# Patient Record
Sex: FEMALE | Race: WHITE | NOT HISPANIC OR LATINO | Employment: OTHER | URBAN - METROPOLITAN AREA
[De-identification: names, ages, dates, MRNs, and addresses within clinical notes are randomized per-mention and may not be internally consistent; named-entity substitution may affect disease eponyms.]

---

## 2017-01-06 RX ORDER — PRAMIPEXOLE DIHYDROCHLORIDE 1 MG/1
1 TABLET ORAL NIGHTLY
Qty: 30 TABLET | Refills: 3 | Status: SHIPPED | OUTPATIENT
Start: 2017-01-06

## 2017-01-06 RX ORDER — TRAZODONE HYDROCHLORIDE 50 MG/1
50 TABLET ORAL NIGHTLY
Qty: 30 TABLET | Refills: 3 | Status: SHIPPED | OUTPATIENT
Start: 2017-01-06

## 2017-01-23 ENCOUNTER — OFFICE VISIT (OUTPATIENT)
Dept: PODIATRY | Facility: CLINIC | Age: 62
End: 2017-01-23
Payer: COMMERCIAL

## 2017-01-23 ENCOUNTER — HOSPITAL ENCOUNTER (OUTPATIENT)
Dept: RADIOLOGY | Facility: CLINIC | Age: 62
Discharge: HOME | End: 2017-01-23
Attending: PODIATRIST
Payer: COMMERCIAL

## 2017-01-23 ENCOUNTER — TELEPHONE (OUTPATIENT)
Dept: FAMILY MEDICINE | Facility: CLINIC | Age: 62
End: 2017-01-23

## 2017-01-23 VITALS — BODY MASS INDEX: 30.09 KG/M2 | HEIGHT: 60 IN | WEIGHT: 153.25 LBS

## 2017-01-23 DIAGNOSIS — S90.32XA CONTUSION OF FOOT, LEFT: ICD-10-CM

## 2017-01-23 DIAGNOSIS — S92.505A CLOSED NONDISPLACED FRACTURE OF PHALANX OF LESSER TOE OF LEFT FOOT, UNSPECIFIED PHALANX, INITIAL ENCOUNTER: ICD-10-CM

## 2017-01-23 DIAGNOSIS — S92.505A CLOSED NONDISPLACED FRACTURE OF PHALANX OF LESSER TOE OF LEFT FOOT, UNSPECIFIED PHALANX, INITIAL ENCOUNTER: Primary | ICD-10-CM

## 2017-01-23 PROCEDURE — 99213 OFFICE O/P EST LOW 20 MIN: CPT | Mod: 25,S$GLB,, | Performed by: PODIATRIST

## 2017-01-23 PROCEDURE — 64455 NJX AA&/STRD PLTR COM DG NRV: CPT | Mod: RT,S$GLB,, | Performed by: PODIATRIST

## 2017-01-23 PROCEDURE — 73630 X-RAY EXAM OF FOOT: CPT | Mod: 50,TC,PO

## 2017-01-23 PROCEDURE — 73630 X-RAY EXAM OF FOOT: CPT | Mod: 26,50,S$GLB, | Performed by: RADIOLOGY

## 2017-01-23 PROCEDURE — 99999 PR PBB SHADOW E&M-EST. PATIENT-LVL III: CPT | Mod: PBBFAC,,, | Performed by: PODIATRIST

## 2017-01-23 PROCEDURE — 29550 STRAPPING OF TOES: CPT | Mod: 59,LT,S$GLB, | Performed by: PODIATRIST

## 2017-01-23 RX ORDER — DICLOFENAC SODIUM 10 MG/G
2 GEL TOPICAL 4 TIMES DAILY
Qty: 100 G | Refills: 2 | Status: SHIPPED | OUTPATIENT
Start: 2017-01-23

## 2017-01-23 RX ORDER — DEXAMETHASONE SODIUM PHOSPHATE 4 MG/ML
4 INJECTION, SOLUTION INTRA-ARTICULAR; INTRALESIONAL; INTRAMUSCULAR; INTRAVENOUS; SOFT TISSUE
Status: COMPLETED | OUTPATIENT
Start: 2017-01-23 | End: 2017-01-23

## 2017-01-23 RX ORDER — TRIAMCINOLONE ACETONIDE 40 MG/ML
40 INJECTION, SUSPENSION INTRA-ARTICULAR; INTRAMUSCULAR
Status: COMPLETED | OUTPATIENT
Start: 2017-01-23 | End: 2017-01-23

## 2017-01-23 RX ADMIN — TRIAMCINOLONE ACETONIDE 40 MG: 40 INJECTION, SUSPENSION INTRA-ARTICULAR; INTRAMUSCULAR at 07:01

## 2017-01-23 RX ADMIN — DEXAMETHASONE SODIUM PHOSPHATE 4 MG: 4 INJECTION, SOLUTION INTRA-ARTICULAR; INTRALESIONAL; INTRAMUSCULAR; INTRAVENOUS; SOFT TISSUE at 07:01

## 2017-01-23 NOTE — PROGRESS NOTES
Reviewed resident note, exam and procedures were performed under my direct supervision.  Agree with note and care.  Discrepancies discussed.    Left 5th toe fx, right stump neuroma.    Counseled the patient on the potential complications of local injection of corticosteroid including, but not limited to:  Discoloration of skin, erosion of soft tissue, and increased likelihood of rupture of a soft tissue structure (ie. Plantar fascia, muscle, tendon, ligament, or capsule in the area of injection).  Patient indicates understanding of my explanation, that any questions have been answered to patient satisfaction, and patient gives verbal consent for injection of affected area.    After sterile skin prep, steroid injection was performed with patient verbal consent and timeout procedure, at right 3rd intermetatarsal plantar nerve using  20 mg of Kenalog, 4mg dexamethazone sodium phosphate, and 1mL 2% Lidocaine plain. This was well tolerated.    Patient will stretch the tendo achilles complex three times daily as demonstrated in the office.  Literature was dispensed illustrating proper stretching technique.    I applied a plantar rest strapping to the patient's foot to offload symptomatic area, support the arch, and relieve pain.    Timothy tape/compression strapping left 5th toe - minimize motion, swelling, pain - repeat daily as symptoms dictate - ambulate in sx shoe to tolerance - wean to shoes as symptoms allow.    X-rays both feet.  voltaren gel prn.    Discussed conservative treatment with shoes of adequate dimensions, material, and style to alleviate symptoms and delay or prevent surgical intervention.     Return to clinic one month, sooner prn.

## 2017-01-23 NOTE — MR AVS SNAPSHOT
[facility]  [address] [place] [address]  Phone: [phone]                  Sierra Cochran   2017 7:00 AM   Office Visit    Description:  Female : 1955   Provider:  Kelton Bermudez DPM   Department:  [facility]           Reason for Visit     Foot Injury           Diagnoses this Visit        Comments    Closed nondisplaced fracture of phalanx of lesser toe of left foot, unspecified phalanx, initial encounter    -  Primary            To Do List           Future Appointments        Provider Department Dept Phone    2017 7:45 AM [facility] XR1 [facility] [phone]    2017 8:00 AM [facility] DEXA 1 [facility] [phone]    2017 7:00 AM Kelton Bermudez DPM [facility] [phone]    3/3/2017 8:00 AM Sigifredo Blakely PA-C [facility] [phone]      Goals (5 Years of Data)     None      Follow-Up and Disposition     Return in about 1 month (around 2017).       These Medications        Disp Refills Start End    diclofenac sodium 1 % Gel 100 g 2 2017     Apply 2 g topically 4 (four) times daily. - Topical    Pharmacy: [facility] [address] [place] [address] Ph #: [phone]         [facility]     [facility] -  Assistance  Registered nurses in the [facility] provide clinical advisement, health education, appointment booking, and other advisory services.  Call for this free service at [phone].             Medications           Message regarding Medications     Verify the changes and/or additions to your medication regime listed below are the same as discussed with your clinician today.  If any of these changes or additions are incorrect, please notify your healthcare provider.        START taking these NEW medications        Refills    diclofenac sodium 1 % Gel 2    Sig: Apply 2 g topically 4 (four) times daily.    Class: Normal     Route: Topical      These medications were administered today        Dose Freq    dexamethasone injection 4 mg 4 mg Clinic/HOD 1 time    Sig: Inject 1 mL (4 mg total) into the vein one time.    Class: Normal    Route: Intravenous    triamcinolone acetonide injection 40 mg 40 mg Clinic/HOD 1 time    Sig: Inject 1 mL (40 mg total) into the muscle one time.    Class: Normal    Route: Intramuscular           Verify that the below list of medications is an accurate representation of the medications you are currently taking.  If none reported, the list may be blank. If incorrect, please contact your healthcare provider. Carry this list with you in case of emergency.           Current Medications     clobetasol (TEMOVATE) 0.05 % cream Apply topically 2 (two) times daily.    cyclobenzaprine (FLEXERIL) 10 MG tablet Take 10 mg by mouth every evening.     diclofenac sodium 1 % Gel Apply 2 g topically 4 (four) times daily.    diphenhydrAMINE (BENADRYL ALLERGY) 25 mg tablet Take 25 mg by mouth nightly as needed for Insomnia.    docusate sodium (COLACE) 50 MG capsule Take 100 mg by mouth 2 (two) times daily.    estradiol (ESTRACE) 1 MG tablet Take 1 tablet (1 mg total) by mouth once daily.    gabapentin (NEURONTIN) 100 MG capsule 1 pill am, 1 pill noon    gabapentin (NEURONTIN) 300 MG capsule Take 1 capsule (300 mg total) by mouth every evening.    omeprazole (PRILOSEC) 40 MG capsule Take 40 mg by mouth 2 (two) times daily before meals.    oxycodone-acetaminophen (PERCOCET)  mg per tablet Take 1 tablet by mouth every 4 (four) hours as needed for Pain.    pramipexole (MIRAPEX) 1 MG tablet Take 1 tablet (1 mg total) by mouth every evening.    rizatriptan (MAXALT) 10 MG tablet Take 1 tablet (10 mg total) by mouth 3 (three) times daily as needed.    tramadol (ULTRAM) 50 mg tablet Take 1 tablet (50 mg total) by mouth every 6 (six) hours as needed for Pain.    trazodone (DESYREL) 50 MG tablet Take 1 tablet (50 mg total) by mouth  every evening.    zolpidem (AMBIEN) 10 mg Tab TAKE ONE TABLET BY MOUTH AT BEDTIME AS NEEDED           Clinical Reference Information           Vital Signs - Last Recorded  Most recent update: 1/23/2017  6:58 AM by Trice Cerrato LPN    Ht Wt BMI          5' (1.524 m) 69.5 kg (153 lb 3.5 oz) 29.92 kg/m2        Allergies as of 1/23/2017     Tizanidine    Augmentin [Amoxicillin-pot Clavulanate]    Codeine    Demerol [Meperidine]      Immunizations Administered on Date of Encounter - 1/23/2017     None      Orders Placed During Today's Visit     Future Labs/Procedures Expected by Expires    X-Ray Foot Complete Bilateral  1/23/2017 1/24/2018      Administrations This Visit     dexamethasone injection 4 mg     Admin Date Action Dose Route Administered By             01/23/2017 Given 4 mg Intravenous Kelton Bermudez DPM                    triamcinolone acetonide injection 40 mg     Admin Date Action Dose Route Administered By             01/23/2017 Given 40 mg Intramuscular Kelton Bermudez DPM

## 2017-01-23 NOTE — TELEPHONE ENCOUNTER
----- Message from Mojgan Lozano sent at 1/23/2017  8:02 AM CST -----  Contact: self [phone]  Pt was here in the clinic getting a chest xray this morning and wanted to see about getting an order put in for a tb test/ please call thanks

## 2017-01-23 NOTE — PROGRESS NOTES
Subjective:      Patient ID: Sierra Cochran is a 61 y.o. female.    Chief Complaint: Foot Injury (broken left 5th toe)    Sierra is a 61 y.o. female who presents to the podiatry clinic  with complaint of  left foot fifth toe after stubbing the toe on a door about a week ago.  Current symptoms include: ability to bear weight, but with some pain, worsening symptoms after a period of activity and pain with pressure to the left fifth toe.  Symptoms have progressed to a point and plateaued. Patient has had prior foot problems with a neuroma to the right foot removed several years ago. Evaluation to date: plain films: At another facility, patient says she was told the toe was broken. Treatment to date: none. Patients rates pain 5/10 on pain scale.    Cc#2: Right foot she had neurectomies of interspaces 2-3 a couple years ago and recently has been having some pain, only with weight bearing. She describes it like she is walking on a balled up sock in the area. She has not done anything to treat this yet. The symptoms have progressed to a point but do not seem to be getting worse right now.    Review of Systems   Constitution: Negative for chills, fever, weakness and malaise/fatigue.   Cardiovascular: Negative for claudication and leg swelling.   Respiratory: Negative for shortness of breath.    Skin: Negative for dry skin, itching, nail changes and rash.   Musculoskeletal: Negative for joint pain, muscle cramps, muscle weakness and myalgias.   Gastrointestinal: Negative for nausea and vomiting.   Neurological: Positive for numbness (right 3-4 toes at times). Negative for focal weakness, loss of balance and paresthesias.           Objective:      Physical Exam   Constitutional: She is oriented to person, place, and time. She appears well-developed and well-nourished. No distress.   Cardiovascular:   Pulses:       Dorsalis pedis pulses are 2+ on the right side, and 2+ on the left side.        Posterior tibial pulses are 2+ on  the right side, and 2+ on the left side.   < 3 sec capillary refill time to toes 1-5 bilateral. Toes and feet are warm to touch proximally with normal distal cooling b/l. There is some hair growth on the feet and toes b/l. There is no edema b/l. No spider veins or varicosities present b/l.      Musculoskeletal:   Left fifth toe slight bruising/erythema with pain to palpation.    Equinus noted b/l ankles with < 10 deg DF noted. MMT 5/5 in DF/PF/Inv/Ev resistance with no reproduction of pain in any direction. Passive range of motion of ankle and pedal joints is painless b/l.     Neurological: She is alert and oriented to person, place, and time. She has normal strength. She displays no atrophy and no tremor. No sensory deficit. She exhibits normal muscle tone.   Reflex Scores:       Achilles reflexes are 2+ on the right side and 2+ on the left side.  Negative tinel sign bilateral. Pain with palpation within the third interspace where a previous neuroma was removed.   Skin: Skin is warm, dry and intact. Bruising (left fifth toe) noted. No abrasion, no burn, no ecchymosis, no laceration, no lesion, no petechiae and no rash noted. She is not diaphoretic. There is erythema. No cyanosis. No pallor. Nails show no clubbing.   Skin temperature, texture and turgor within normal limits.   Psychiatric: She has a normal mood and affect. Her behavior is normal.             Assessment:       Encounter Diagnosis   Name Primary?    Closed nondisplaced fracture of phalanx of lesser toe of left foot, unspecified phalanx, initial encounter Yes         Plan:       Sierra was seen today for foot injury.    Diagnoses and all orders for this visit:    Closed nondisplaced fracture of phalanx of lesser toe of left foot, unspecified phalanx, initial encounter  -     X-Ray Foot Complete Bilateral; Future    Other orders  -     diclofenac sodium 1 % Gel; Apply 2 g topically 4 (four) times daily.  -     dexamethasone injection 4 mg; Inject 1 mL  (4 mg total) into the vein one time.  -     triamcinolone acetonide injection 40 mg; Inject 1 mL (40 mg total) into the muscle one time.      I counseled the patient on her conditions, their implications and medical management.    After sterilizing the area with an alcohol prep, the affected area right third interspace was injected with a  solution containing 1 cc of 2% Lidocaine  plain and 1 cc of dexamethasone, 1 cc kenalog.  The patient tolerated the injection well and reported comfort to the area     Left fifth toe was sultana splinted to the fourth toe, x-rays to be taken to confirm fracture    I applied a plantar rest strapping to the patient's right foot to offload symptomatic area, support the arch, and relieve pain.    Return in 2 weeks for follow up    Avinash Garg DPM PGY-3  Pager [phone]

## 2017-02-08 RX ORDER — ZOLPIDEM TARTRATE 10 MG/1
TABLET ORAL
Qty: 30 TABLET | Refills: 0 | Status: SHIPPED | OUTPATIENT
Start: 2017-02-08

## 2017-02-20 ENCOUNTER — OFFICE VISIT (OUTPATIENT)
Dept: RHEUMATOLOGY | Facility: CLINIC | Age: 62
End: 2017-02-20
Payer: COMMERCIAL

## 2017-02-20 VITALS
SYSTOLIC BLOOD PRESSURE: 113 MMHG | BODY MASS INDEX: 29.82 KG/M2 | HEART RATE: 94 BPM | WEIGHT: 151.88 LBS | DIASTOLIC BLOOD PRESSURE: 74 MMHG | HEIGHT: 60 IN

## 2017-02-20 DIAGNOSIS — M85.80 OSTEOPENIA: ICD-10-CM

## 2017-02-20 DIAGNOSIS — M18.11 PRIMARY OSTEOARTHRITIS OF FIRST CARPOMETACARPAL JOINT OF RIGHT HAND: ICD-10-CM

## 2017-02-20 DIAGNOSIS — M79.7 FIBROMYALGIA: ICD-10-CM

## 2017-02-20 DIAGNOSIS — R76.8 ANA POSITIVE: Primary | ICD-10-CM

## 2017-02-20 PROCEDURE — 99214 OFFICE O/P EST MOD 30 MIN: CPT | Mod: S$GLB,,, | Performed by: INTERNAL MEDICINE

## 2017-02-20 PROCEDURE — 99999 PR PBB SHADOW E&M-EST. PATIENT-LVL III: CPT | Mod: PBBFAC,,, | Performed by: INTERNAL MEDICINE

## 2017-02-20 ASSESSMENT — ROUTINE ASSESSMENT OF PATIENT INDEX DATA (RAPID3)
PAIN SCORE: 7.5
PATIENT GLOBAL ASSESSMENT SCORE: 6
MDHAQ FUNCTION SCORE: 1.1
PSYCHOLOGICAL DISTRESS SCORE: 5.5
TOTAL RAPID3 SCORE: 5.72

## 2017-02-20 NOTE — PROGRESS NOTES
Subjective:       Patient ID: Sierra Cochran is a 62 y.o. female.    Chief Complaint: Fibromyalgia  HPI 63 yo female with migraines is here for follow-up for fibromyalgia.  In the past, she has seen Dr Alvares.   Currently on gabapentin 100 mg by mout in a.m., 100 mg at lunch, 300 mg at bedtime.  still has diffuse pain 7.7/10The pain is aching, worse with activity, worse at night. No joint swelling.  Continues to be on  trazadone 50mg po hs. and occasional Ambien.  Flexeril has been decreased to 10 mg by mouth daily at bedtime. In the past, ELavil did not help. Never took cymbalta. Robaxin, savellacarlynrica      Review of Systems   Constitutional: Positive for fatigue.   HENT: Negative for ear discharge and ear pain.    Eyes: Negative for pain and redness.   Respiratory: Negative for cough and shortness of breath.    Cardiovascular: Negative for chest pain and palpitations.   Gastrointestinal: Negative for abdominal distention and abdominal pain.   Genitourinary: Negative for genital sores and hematuria.   Neurological: Negative for tremors and seizures.   Psychiatric/Behavioral: Negative for agitation and hallucinations.         Objective:     Visit Vitals    /74 (BP Location: Left arm, Patient Position: Sitting, BP Method: Automatic)    Pulse 94    Ht 5' (1.524 m)    Wt 68.9 kg (151 lb 14.4 oz)    BMI 29.67 kg/m2        Physical Exam   Constitutional: She is oriented to person, place, and time and well-developed, well-nourished, and in no distress.   HENT:   Head: Normocephalic and atraumatic.   Eyes: Conjunctivae and EOM are normal. Pupils are equal, round, and reactive to light.   Neck: Normal range of motion. Neck supple. No thyromegaly present.   Cardiovascular: Normal rate and regular rhythm.    Pulmonary/Chest: Effort normal and breath sounds normal.   Abdominal: Soft. Bowel sounds are normal.   Neurological: She is alert and oriented to person, place, and time.   Skin: Skin is warm and dry.      Psychiatric: Memory and affect normal.   Musculoskeletal: She exhibits no edema.   No joint effusion           Lab Results   Component Value Date    WBC 7.75 05/14/2016    HGB 12.2 05/14/2016    HCT 37.9 05/14/2016    MCV 82 05/14/2016     05/14/2016     CMP  Sodium   Date Value Ref Range Status   01/22/2016 139 136 - 145 mmol/L Final     Potassium   Date Value Ref Range Status   01/22/2016 4.4 3.5 - 5.1 mmol/L Final     Chloride   Date Value Ref Range Status   01/22/2016 103 95 - 110 mmol/L Final     CO2   Date Value Ref Range Status   01/22/2016 28 23 - 29 mmol/L Final     Glucose   Date Value Ref Range Status   01/22/2016 86 70 - 110 mg/dL Final     BUN, Bld   Date Value Ref Range Status   01/22/2016 8 6 - 20 mg/dL Final     Creatinine   Date Value Ref Range Status   01/22/2016 0.8 0.5 - 1.4 mg/dL Final     Calcium   Date Value Ref Range Status   01/22/2016 9.5 8.7 - 10.5 mg/dL Final     Total Protein   Date Value Ref Range Status   01/22/2016 7.1 6.0 - 8.4 g/dL Final     Albumin   Date Value Ref Range Status   01/22/2016 3.9 3.5 - 5.2 g/dL Final     Total Bilirubin   Date Value Ref Range Status   01/22/2016 0.4 0.1 - 1.0 mg/dL Final     Comment:     For infants and newborns, interpretation of results should be based  on gestational age, weight and in agreement with clinical  observations.  Premature Infant recommended reference ranges:  Up to 24 hours.............<8.0 mg/dL  Up to 48 hours............<12.0 mg/dL  3-5 days..................<15.0 mg/dL  6-29 days.................<15.0 mg/dL       Alkaline Phosphatase   Date Value Ref Range Status   01/22/2016 112 55 - 135 U/L Final     AST   Date Value Ref Range Status   01/22/2016 21 10 - 40 U/L Final     ALT   Date Value Ref Range Status   01/22/2016 15 10 - 44 U/L Final     Anion Gap   Date Value Ref Range Status   01/22/2016 8 8 - 16 mmol/L Final     eGFR if    Date Value Ref Range Status   01/22/2016 >60.0 >60 mL/min/1.73 m^2 Final      eGFR if non    Date Value Ref Range Status   01/22/2016 >60.0 >60 mL/min/1.73 m^2 Final     Comment:     Calculation used to obtain the estimated glomerular filtration  rate (eGFR) is the CKD-EPI equation. Since race is unknown   in our information system, the eGFR values for   -American and Non--American patients are given   for each creatinine result.     Results for AZAEL MONTALVO (MRN [id number]) as of 2/20/2017 08:33   Ref. Range 11/22/2016 15:01 11/22/2016 15:01   MIGUEL HEP-2 Titer Unknown Positive 1:320 Sp...    Anti-SSA Antibody Latest Ref Range: 0.00 - 19.99 EU 3.46 3.46   Anti-SSA Interpretation Latest Ref Range: Negative  Negative Negative   Anti-SSB Antibody Latest Ref Range: 0.00 - 19.99 EU  1.23   Anti-SSB Interpretation Latest Ref Range: Negative   Negative   ds DNA Ab Latest Ref Range: Negative 1:10   Negative 1:10   Anti Sm Antibody Latest Ref Range: 0.00 - 19.99 EU  2.65   Anti-Sm Interpretation Latest Ref Range: Negative   Negative   Anti Sm/RNP Antibody Latest Ref Range: 0.00 - 19.99 EU  2.01   Anti-Sm/RNP Interpretation Latest Ref Range: Negative   Negative   CCP Antibodies Latest Ref Range: <5.0 U/mL <0.5    Rheumatoid Factor Latest Ref Range: 0.0 - 15.0 IU/mL <10.0        Assessment:   Positive MIGUEL 1:320 S-negative profile  Fibromyalgia   Chronic migraines  Osteoarthritis of multiple joints  Osteopenia-check DEXA  Plan:   Discussed results with patient, answered all her questions   Check , C3, C4, UA, UP/C,  antiphospholipid antibody,  Lupus anticoagulant beta-2 glycoprotein,  anti-SCL antibody  Increase gabapentin to 100 mg in am, 100mg at lunch and 400mg at bedtime (changed to 600 and bedtime if 400 not helpful)  Continue Flexeril to 10 mg by mouth daily at bedtime as needed  Await DEXA scan  Return to clinic in 3 months

## 2017-02-20 NOTE — MR AVS SNAPSHOT
[facility]  [address]  [place] [address]  Phone: [phone]  Fax: [phone]                  Sierra Cochran   2017 8:00 AM   Office Visit    Description:  Female : 1955   Provider:  Kathryn Hwang MD   Department:  [facility]           Reason for Visit     Follow-up     Pain           Diagnoses this Visit        Comments    MIGUEL positive    -  Primary            To Do List           Future Appointments        Provider Department Dept Phone    2017 7:00 AM Kelton Bermudez DPM [facility] [phone]    2017 9:00 AM [facility] [phone]    2017 9:10 AM [facility] [phone]    3/3/2017 8:00 AM Sigifredo Blakely PA-C [facility] [phone]      Goals (5 Years of Data)     None      [facility]     [facility] Nurse Care Line -  Assistance  Registered nurses in the [facility] provide clinical advisement, health education, appointment booking, and other advisory services.  Call for this free service at [phone].             Medications           Message regarding Medications     Verify the changes and/or additions to your medication regime listed below are the same as discussed with your clinician today.  If any of these changes or additions are incorrect, please notify your healthcare provider.             Verify that the below list of medications is an accurate representation of the medications you are currently taking.  If none reported, the list may be blank. If incorrect, please contact your healthcare provider. Carry this list with you in case of emergency.           Current Medications     clobetasol (TEMOVATE) 0.05 % cream Apply topically 2 (two) times daily.    cyclobenzaprine (FLEXERIL) 10 MG tablet Take 10 mg by mouth every evening.     diclofenac sodium 1 % Gel Apply 2 g topically 4 (four) times  daily.    diphenhydrAMINE (BENADRYL ALLERGY) 25 mg tablet Take 25 mg by mouth nightly as needed for Insomnia.    docusate sodium (COLACE) 50 MG capsule Take 100 mg by mouth 2 (two) times daily.    estradiol (ESTRACE) 1 MG tablet Take 1 tablet (1 mg total) by mouth once daily.    gabapentin (NEURONTIN) 100 MG capsule 1 pill am, 1 pill noon    gabapentin (NEURONTIN) 300 MG capsule Take 1 capsule (300 mg total) by mouth every evening.    omeprazole (PRILOSEC) 40 MG capsule Take 40 mg by mouth 2 (two) times daily before meals.    oxycodone-acetaminophen (PERCOCET)  mg per tablet Take 1 tablet by mouth every 4 (four) hours as needed for Pain.    pramipexole (MIRAPEX) 1 MG tablet Take 1 tablet (1 mg total) by mouth every evening.    rizatriptan (MAXALT) 10 MG tablet Take 1 tablet (10 mg total) by mouth 3 (three) times daily as needed.    tramadol (ULTRAM) 50 mg tablet Take 1 tablet (50 mg total) by mouth every 6 (six) hours as needed for Pain.    trazodone (DESYREL) 50 MG tablet Take 1 tablet (50 mg total) by mouth every evening.    zolpidem (AMBIEN) 10 mg Tab TAKE ONE TABLET BY MOUTH AT BEDTIME AS NEEDED           Clinical Reference Information           Your Vitals Were     BP Pulse Height Weight BMI    113/74 (BP Location: Left arm, Patient Position: Sitting, BP Method: Automatic) 94 5' (1.524 m) 68.9 kg (151 lb 14.4 oz) 29.67 kg/m2      Blood Pressure          Most Recent Value    BP  113/74      Allergies as of 2/20/2017     Tizanidine    Augmentin [Amoxicillin-pot Clavulanate]    Codeine    Demerol [Meperidine]      Immunizations Administered on Date of Encounter - 2/20/2017     None      Orders Placed During Today's Visit     Future Labs/Procedures Expected by Expires    Anti-scleroderma antibody  2/20/2017 4/21/2018    Beta-2 glycoprotein antibodies  2/20/2017 4/21/2018    C3 complement  2/20/2017 4/21/2018    C4 complement  2/20/2017 4/21/2018    Cardiolipin antibody  2/20/2017 4/21/2018    DRVVT  2/20/2017  4/21/2018    Protein / creatinine ratio, urine  2/20/2017 4/21/2018    Urinalysis  2/20/2017 4/21/2018      Language Assistance Services     ATTENTION: Language assistance services are available, free of charge. Please call [phone].      ATENCIÓN: Si habla niko, tiene a gama disposición servicios gratuitos de asistencia lingüística. Llame al [phone].     CHÚ Ý: N?u b?n nói Ti?ng Vi?t, có các d?ch v? h? tr? ngôn ng? mi?n phí dành cho b?n. G?i s? [phone].         [facility] complies with applicable Federal civil rights laws and does not discriminate on the basis of race, color, national origin, age, disability, or sex.

## 2017-02-23 ENCOUNTER — OFFICE VISIT (OUTPATIENT)
Dept: PODIATRY | Facility: CLINIC | Age: 62
End: 2017-02-23
Payer: COMMERCIAL

## 2017-02-23 VITALS — HEIGHT: 60 IN | BODY MASS INDEX: 29.64 KG/M2 | WEIGHT: 151 LBS

## 2017-02-23 DIAGNOSIS — S90.32XA CONTUSION OF FOOT, LEFT: Primary | ICD-10-CM

## 2017-02-23 PROCEDURE — 99212 OFFICE O/P EST SF 10 MIN: CPT | Mod: S$GLB,,, | Performed by: PODIATRIST

## 2017-02-23 PROCEDURE — 99999 PR PBB SHADOW E&M-EST. PATIENT-LVL II: CPT | Mod: PBBFAC,,, | Performed by: PODIATRIST

## 2017-02-23 NOTE — PROGRESS NOTES
Subjective:      Patient ID: Sierra Cochran is a 62 y.o. female.    Chief Complaint: Toe Injury (follow up fracture 5th toe) and Foot Pain (right)     Left 5th toe pain resolved with adequately sized shoes, rest, tylenol prn.  X-ray negative fracture.    Review of Systems   Constitution: Negative for chills, diaphoresis, fever, malaise/fatigue and night sweats.   Cardiovascular: Negative for claudication, cyanosis, leg swelling and syncope.   Skin: Negative for color change, dry skin, rash, suspicious lesions and unusual hair distribution.   Musculoskeletal: Negative for falls, joint pain, joint swelling, muscle cramps, muscle weakness and stiffness.   Gastrointestinal: Negative for constipation, diarrhea, nausea and vomiting.   Neurological: Negative for brief paralysis, disturbances in coordination, focal weakness, numbness, paresthesias, sensory change and tremors.           Objective:      Physical Exam   Constitutional: She appears well-developed and well-nourished. She is cooperative. No distress.   Cardiovascular:   Pulses:       Popliteal pulses are 2+ on the right side, and 2+ on the left side.        Dorsalis pedis pulses are 1+ on the right side, and 1+ on the left side.        Posterior tibial pulses are 1+ on the right side, and 1+ on the left side.   Capillary refill 3 seconds all toes/distal feet, all toes/both feet warm to touch.      Negative lymphadenopathy bilateral popliteal fossa and tarsal tunnel.      Negavie lower extremity edema bilateral.     Musculoskeletal:        Right ankle: Normal. She exhibits normal range of motion, no swelling, no ecchymosis, no deformity, no laceration and normal pulse. Achilles tendon normal. Achilles tendon exhibits no pain, no defect and normal Dowell's test results.   Normal angle, base, station of gait. All ten toes without clubbing, cyanosis, or signs of ischemia.  No pain to palpation bilateral lower extremities.  Range of motion, stability, muscle  strength, and muscle tone normal bilateral feet and legs.     Lymphadenopathy: No inguinal adenopathy noted on the right or left side.   Negative lymphadenopathy bilateral popliteal fossa and tarsal tunnel.   Neurological: She is alert. She has normal strength. She displays no atrophy and no tremor. No sensory deficit. She exhibits normal muscle tone. She displays no seizure activity. Gait normal.   Reflex Scores:       Patellar reflexes are 2+ on the right side and 2+ on the left side.       Achilles reflexes are 2+ on the right side and 2+ on the left side.  Negative tinel sign to percussion sural, superficial peroneal, deep peroneal, saphenous, and posterior tibial nerves right and left ankles and feet.       Skin: Skin is warm, dry and intact. No abrasion, no bruising, no burn, no ecchymosis, no laceration, no lesion and no rash noted. She is not diaphoretic. No cyanosis or erythema. No pallor. Nails show no clubbing.     Skin is normal age and health appropriate color, turgor, texture, and temperature bilateral lower extremities without ulceration, hyperpigmentation, discoloration, masses nodules or cords palpated.  No ecchymosis, erythema, edema, or cardinal signs of infection bilateral lower extremities.               Assessment:       Encounter Diagnosis   Name Primary?    Contusion of foot, left Yes         Plan:       Sierra was seen today for toe injury and foot pain.    Diagnoses and all orders for this visit:    Contusion of foot, left      I counseled the patient on her conditions, their implications and medical management.        Discussed conservative treatment with shoes of adequate dimensions, material, and style to alleviate symptoms and delay or prevent surgical intervention.            Return if symptoms worsen or fail to improve.

## 2017-02-23 NOTE — MR AVS SNAPSHOT
[facility]  [address] [place] [address]  Phone: [phone]                  Sierra Cochran   2017 7:00 AM   Office Visit    Description:  Female : 1955   Provider:  Kelton Bermudez DPM   Department:  [facility]           Reason for Visit     Toe Injury     Foot Pain           Diagnoses this Visit        Comments    Contusion of foot, left    -  Primary            To Do List           Future Appointments        Provider Department Dept Phone    2017 9:00 AM [facility] [phone]    2017 9:10 AM [facility] [phone]    3/3/2017 8:00 [facility] [phone]      Goals (5 Years of Data)     None      Follow-Up and Disposition     Return if symptoms worsen or fail to improve.      [facility]     [facility] Care [facility] -  Assistance  Registered nurses in the [facility] provide clinical advisement, health education, appointment booking, and other advisory services.  Call for this free service at [phone].             Medications           Message regarding Medications     Verify the changes and/or additions to your medication regime listed below are the same as discussed with your clinician today.  If any of these changes or additions are incorrect, please notify your healthcare provider.        STOP taking these medications     oxycodone-acetaminophen (PERCOCET)  mg per tablet Take 1 tablet by mouth every 4 (four) hours as needed for Pain.    docusate sodium (COLACE) 50 MG capsule Take 100 mg by mouth 2 (two) times daily.    cyclobenzaprine (FLEXERIL) 10 MG tablet Take 10 mg by mouth every evening.            Verify that the below list of medications is an accurate representation of the medications you are currently taking.  If none reported, the list may be blank. If incorrect, please contact your  healthcare provider. Carry this list with you in case of emergency.           Current Medications     diclofenac sodium 1 % Gel Apply 2 g topically 4 (four) times daily.    estradiol (ESTRACE) 1 MG tablet Take 1 tablet (1 mg total) by mouth once daily.    gabapentin (NEURONTIN) 100 MG capsule 1 pill am, 1 pill noon    gabapentin (NEURONTIN) 300 MG capsule Take 1 capsule (300 mg total) by mouth every evening.    omeprazole (PRILOSEC) 40 MG capsule Take 40 mg by mouth 2 (two) times daily before meals.    trazodone (DESYREL) 50 MG tablet Take 1 tablet (50 mg total) by mouth every evening.    clobetasol (TEMOVATE) 0.05 % cream Apply topically 2 (two) times daily.    diphenhydrAMINE (BENADRYL ALLERGY) 25 mg tablet Take 25 mg by mouth nightly as needed for Insomnia.    pramipexole (MIRAPEX) 1 MG tablet Take 1 tablet (1 mg total) by mouth every evening.    rizatriptan (MAXALT) 10 MG tablet Take 1 tablet (10 mg total) by mouth 3 (three) times daily as needed.    tramadol (ULTRAM) 50 mg tablet Take 1 tablet (50 mg total) by mouth every 6 (six) hours as needed for Pain.    zolpidem (AMBIEN) 10 mg Tab TAKE ONE TABLET BY MOUTH AT BEDTIME AS NEEDED           Clinical Reference Information           Your Vitals Were     Height Weight BMI          5' (1.524 m) 68.5 kg (151 lb 0.2 oz) 29.49 kg/m2        Allergies as of 2/23/2017     Tizanidine    Augmentin [Amoxicillin-pot Clavulanate]    Codeine    Demerol [Meperidine]      Immunizations Administered on Date of Encounter - 2/23/2017     None      Language Assistance Services     ATTENTION: Language assistance services are available, free of charge. Please call [phone].      ATENCIÓN: Si miguella niko, tiene a gama disposición servicios gratuitos de asistencia lingüística. Llame al [phone].     CHÚ Ý: N?u b?n nói Ti?ng Vi?t, có các d?ch v? h? tr? ngôn ng? mi?n phí dành cho b?n. G?i s? [phone].         [facility] complies with applicable Federal civil  rights laws and does not discriminate on the basis of race, color, national origin, age, disability, or sex.

## 2017-02-25 ENCOUNTER — LAB VISIT (OUTPATIENT)
Dept: LAB | Facility: HOSPITAL | Age: 62
End: 2017-02-25
Attending: INTERNAL MEDICINE
Payer: COMMERCIAL

## 2017-02-25 DIAGNOSIS — R76.8 ANA POSITIVE: ICD-10-CM

## 2017-02-25 LAB
C3 SERPL-MCNC: 108 MG/DL
C4 SERPL-MCNC: 29 MG/DL

## 2017-02-25 PROCEDURE — 36415 COLL VENOUS BLD VENIPUNCTURE: CPT

## 2017-02-25 PROCEDURE — 86146 BETA-2 GLYCOPROTEIN ANTIBODY: CPT

## 2017-02-25 PROCEDURE — 86160 COMPLEMENT ANTIGEN: CPT | Mod: 59

## 2017-02-25 PROCEDURE — 85613 RUSSELL VIPER VENOM DILUTED: CPT

## 2017-02-25 PROCEDURE — 86235 NUCLEAR ANTIGEN ANTIBODY: CPT

## 2017-02-25 PROCEDURE — 86147 CARDIOLIPIN ANTIBODY EA IG: CPT

## 2017-02-25 PROCEDURE — 86160 COMPLEMENT ANTIGEN: CPT

## 2017-03-01 LAB
B2 GLYCOPROT1 IGA SER QL: <9 SAU
B2 GLYCOPROT1 IGG SER QL: <9 SGU
B2 GLYCOPROT1 IGM SER QL: 21 SMU
CARDIOLIPIN IGG SER IA-ACNC: <9.4 GPL
CARDIOLIPIN IGM SER IA-ACNC: <9.4 MPL
ENA SCL70 AB SER-ACNC: 6 UNITS
LA PPP-IMP: NEGATIVE

## 2017-03-09 ENCOUNTER — PATIENT MESSAGE (OUTPATIENT)
Dept: RHEUMATOLOGY | Facility: CLINIC | Age: 62
End: 2017-03-09

## 2017-03-09 DIAGNOSIS — M25.50 POLYARTHRALGIA: ICD-10-CM

## 2017-03-09 RX ORDER — GABAPENTIN 100 MG/1
CAPSULE ORAL
Qty: 90 CAPSULE | Refills: 0 | Status: SHIPPED | OUTPATIENT
Start: 2017-03-09 | End: 2017-03-17 | Stop reason: SDUPTHER

## 2017-03-09 RX ORDER — GABAPENTIN 300 MG/1
300 CAPSULE ORAL NIGHTLY
Qty: 30 CAPSULE | Refills: 2 | Status: SHIPPED | OUTPATIENT
Start: 2017-03-09 | End: 2018-03-09

## 2017-03-15 ENCOUNTER — PATIENT MESSAGE (OUTPATIENT)
Dept: FAMILY MEDICINE | Facility: CLINIC | Age: 62
End: 2017-03-15

## 2017-03-15 ENCOUNTER — TELEPHONE (OUTPATIENT)
Dept: FAMILY MEDICINE | Facility: CLINIC | Age: 62
End: 2017-03-15

## 2017-03-15 NOTE — TELEPHONE ENCOUNTER
----- Message from Sarai Butler RT sent at 3/15/2017  9:09 AM CDT -----  Contact: pt    pt , requesting an appt Friday 03/17/2017 morning or anytime after 3:00 pm, with Dr. Preston, cramps in legs, thanks.

## 2017-03-17 ENCOUNTER — LAB VISIT (OUTPATIENT)
Dept: LAB | Facility: HOSPITAL | Age: 62
End: 2017-03-17
Attending: FAMILY MEDICINE
Payer: COMMERCIAL

## 2017-03-17 ENCOUNTER — OFFICE VISIT (OUTPATIENT)
Dept: FAMILY MEDICINE | Facility: CLINIC | Age: 62
End: 2017-03-17
Payer: COMMERCIAL

## 2017-03-17 ENCOUNTER — OFFICE VISIT (OUTPATIENT)
Dept: NEUROLOGY | Facility: CLINIC | Age: 62
End: 2017-03-17
Payer: COMMERCIAL

## 2017-03-17 VITALS
WEIGHT: 151 LBS | DIASTOLIC BLOOD PRESSURE: 78 MMHG | BODY MASS INDEX: 29.64 KG/M2 | SYSTOLIC BLOOD PRESSURE: 116 MMHG | HEIGHT: 60 IN | HEART RATE: 59 BPM | OXYGEN SATURATION: 98 %

## 2017-03-17 VITALS
WEIGHT: 151 LBS | HEIGHT: 60 IN | BODY MASS INDEX: 29.64 KG/M2 | RESPIRATION RATE: 20 BRPM | DIASTOLIC BLOOD PRESSURE: 68 MMHG | SYSTOLIC BLOOD PRESSURE: 117 MMHG | HEART RATE: 70 BPM

## 2017-03-17 DIAGNOSIS — R25.2 BILATERAL LEG CRAMPS: ICD-10-CM

## 2017-03-17 DIAGNOSIS — R51.9 INTRACTABLE HEADACHE, UNSPECIFIED CHRONICITY PATTERN, UNSPECIFIED HEADACHE TYPE: ICD-10-CM

## 2017-03-17 DIAGNOSIS — R25.2 BILATERAL LEG CRAMPS: Primary | ICD-10-CM

## 2017-03-17 DIAGNOSIS — G47.9 TROUBLE IN SLEEPING: ICD-10-CM

## 2017-03-17 DIAGNOSIS — G43.009 MIGRAINE WITHOUT AURA AND WITHOUT STATUS MIGRAINOSUS, NOT INTRACTABLE: Primary | ICD-10-CM

## 2017-03-17 DIAGNOSIS — G44.209 TENSION HEADACHE: ICD-10-CM

## 2017-03-17 DIAGNOSIS — M79.7 FIBROMYALGIA: ICD-10-CM

## 2017-03-17 DIAGNOSIS — G25.81 RLS (RESTLESS LEGS SYNDROME): ICD-10-CM

## 2017-03-17 LAB
25(OH)D3+25(OH)D2 SERPL-MCNC: 26 NG/ML
ANION GAP SERPL CALC-SCNC: 7 MMOL/L
BASOPHILS # BLD AUTO: 0.03 K/UL
BASOPHILS NFR BLD: 0.4 %
BUN SERPL-MCNC: 15 MG/DL
CALCIUM SERPL-MCNC: 9.4 MG/DL
CHLORIDE SERPL-SCNC: 105 MMOL/L
CK SERPL-CCNC: 80 U/L
CO2 SERPL-SCNC: 29 MMOL/L
CREAT SERPL-MCNC: 0.9 MG/DL
DIFFERENTIAL METHOD: NORMAL
EOSINOPHIL # BLD AUTO: 0.1 K/UL
EOSINOPHIL NFR BLD: 1.7 %
ERYTHROCYTE [DISTWIDTH] IN BLOOD BY AUTOMATED COUNT: 13 %
EST. GFR  (AFRICAN AMERICAN): >60 ML/MIN/1.73 M^2
EST. GFR  (NON AFRICAN AMERICAN): >60 ML/MIN/1.73 M^2
GLUCOSE SERPL-MCNC: 91 MG/DL
HCT VFR BLD AUTO: 39.6 %
HGB BLD-MCNC: 12.9 G/DL
LYMPHOCYTES # BLD AUTO: 2.3 K/UL
LYMPHOCYTES NFR BLD: 27.6 %
MAGNESIUM SERPL-MCNC: 2.2 MG/DL
MCH RBC QN AUTO: 27.6 PG
MCHC RBC AUTO-ENTMCNC: 32.6 %
MCV RBC AUTO: 85 FL
MONOCYTES # BLD AUTO: 0.5 K/UL
MONOCYTES NFR BLD: 6.2 %
NEUTROPHILS # BLD AUTO: 5.3 K/UL
NEUTROPHILS NFR BLD: 64.1 %
PLATELET # BLD AUTO: 297 K/UL
PMV BLD AUTO: 9.3 FL
POTASSIUM SERPL-SCNC: 4.5 MMOL/L
RBC # BLD AUTO: 4.68 M/UL
SODIUM SERPL-SCNC: 141 MMOL/L
TSH SERPL DL<=0.005 MIU/L-ACNC: 0.61 UIU/ML
VIT B12 SERPL-MCNC: 286 PG/ML
WBC # BLD AUTO: 8.23 K/UL

## 2017-03-17 PROCEDURE — 36415 COLL VENOUS BLD VENIPUNCTURE: CPT | Mod: PO

## 2017-03-17 PROCEDURE — 82607 VITAMIN B-12: CPT

## 2017-03-17 PROCEDURE — 64405 NJX AA&/STRD GR OCPL NRV: CPT | Mod: 50,51,S$GLB, | Performed by: PHYSICIAN ASSISTANT

## 2017-03-17 PROCEDURE — 82306 VITAMIN D 25 HYDROXY: CPT

## 2017-03-17 PROCEDURE — 64400 NJX AA&/STRD TRIGEMINAL NRV: CPT | Mod: 50,S$GLB,, | Performed by: PHYSICIAN ASSISTANT

## 2017-03-17 PROCEDURE — 84443 ASSAY THYROID STIM HORMONE: CPT

## 2017-03-17 PROCEDURE — 80048 BASIC METABOLIC PNL TOTAL CA: CPT | Mod: PO

## 2017-03-17 PROCEDURE — 82550 ASSAY OF CK (CPK): CPT | Mod: PO

## 2017-03-17 PROCEDURE — 99999 PR PBB SHADOW E&M-EST. PATIENT-LVL III: CPT | Mod: PBBFAC,,, | Performed by: PHYSICIAN ASSISTANT

## 2017-03-17 PROCEDURE — 99999 PR PBB SHADOW E&M-EST. PATIENT-LVL IV: CPT | Mod: PBBFAC,,, | Performed by: NURSE PRACTITIONER

## 2017-03-17 PROCEDURE — 99213 OFFICE O/P EST LOW 20 MIN: CPT | Mod: S$GLB,,, | Performed by: NURSE PRACTITIONER

## 2017-03-17 PROCEDURE — 85025 COMPLETE CBC W/AUTO DIFF WBC: CPT | Mod: PO

## 2017-03-17 PROCEDURE — 83735 ASSAY OF MAGNESIUM: CPT | Mod: PO

## 2017-03-17 PROCEDURE — 99213 OFFICE O/P EST LOW 20 MIN: CPT | Mod: 25,S$GLB,, | Performed by: PHYSICIAN ASSISTANT

## 2017-03-17 RX ORDER — HYDROXYZINE PAMOATE 25 MG/1
CAPSULE ORAL
Qty: 5 CAPSULE | Refills: 0 | Status: SHIPPED | OUTPATIENT
Start: 2017-03-17 | End: 2017-04-12 | Stop reason: SDUPTHER

## 2017-03-17 RX ORDER — METHYLPREDNISOLONE ACETATE 40 MG/ML
80 INJECTION, SUSPENSION INTRA-ARTICULAR; INTRALESIONAL; INTRAMUSCULAR; SOFT TISSUE
Status: COMPLETED | OUTPATIENT
Start: 2017-03-17 | End: 2017-03-17

## 2017-03-17 RX ORDER — LIDOCAINE HYDROCHLORIDE 20 MG/ML
6 INJECTION, SOLUTION INFILTRATION; PERINEURAL
Status: COMPLETED | OUTPATIENT
Start: 2017-03-17 | End: 2017-03-17

## 2017-03-17 RX ADMIN — LIDOCAINE HYDROCHLORIDE 6 ML: 20 INJECTION, SOLUTION INFILTRATION; PERINEURAL at 11:03

## 2017-03-17 RX ADMIN — METHYLPREDNISOLONE ACETATE 80 MG: 40 INJECTION, SUSPENSION INTRA-ARTICULAR; INTRALESIONAL; INTRAMUSCULAR; SOFT TISSUE at 11:03

## 2017-03-17 NOTE — PROGRESS NOTES
Subjective:       Patient ID: Sierra Cochran is a 62 y.o. female.    Chief Complaint: Leg Pain (bilateral cramping)    Leg Pain    The pain is present in the left leg and right leg. Associated symptoms include muscle weakness. Pertinent negatives include no inability to bear weight, loss of motion, loss of sensation, numbness or tingling.     Review of Systems   Constitutional: Positive for activity change. Negative for unexpected weight change.   HENT: Negative for hearing loss, rhinorrhea and trouble swallowing.    Eyes: Negative for discharge and visual disturbance.   Respiratory: Negative for chest tightness and wheezing.    Cardiovascular: Negative for chest pain and palpitations.   Gastrointestinal: Negative for blood in stool, constipation, diarrhea and vomiting.   Endocrine: Negative for polydipsia and polyuria.   Genitourinary: Negative for difficulty urinating, dysuria, hematuria and menstrual problem.   Musculoskeletal: Negative for arthralgias and joint swelling.   Neurological: Positive for headaches. Negative for tingling, weakness and numbness.   Psychiatric/Behavioral: Negative for confusion and dysphoric mood.       Objective:      Physical Exam   Constitutional: She is oriented to person, place, and time. She appears well-nourished.   Cardiovascular: Normal rate, regular rhythm, normal heart sounds and intact distal pulses.    Pulses:       Dorsalis pedis pulses are 2+ on the right side, and 2+ on the left side.        Posterior tibial pulses are 2+ on the right side, and 2+ on the left side.   Pulmonary/Chest: Effort normal and breath sounds normal. She has no wheezes. She has no rales.   Abdominal: Soft. Bowel sounds are normal. There is no tenderness.   Musculoskeletal:        Right lower leg: She exhibits no tenderness, no swelling and no edema.        Left lower leg: She exhibits no tenderness, no swelling and no edema.   Neurological: She is alert and oriented to person, place, and time.    Skin: Skin is warm and dry.   Vitals reviewed.      Assessment:       1. Bilateral leg cramps    2. RLS (restless legs syndrome)    3. Fibromyalgia        Plan:       Sierra was seen today for leg pain.    Diagnoses and all orders for this visit:    Bilateral leg cramps  -     CBC auto differential; Future  -     Basic metabolic panel; Future  -     TSH; Future  -     Vitamin B12; Future  -     Vitamin D; Future  -     CK; Future  -     Magnesium; Future    RLS (restless legs syndrome)  -     CBC auto differential; Future  -     Basic metabolic panel; Future  -     TSH; Future  -     Vitamin B12; Future  -     Vitamin D; Future  -     CK; Future  -     Magnesium; Future    Fibromyalgia  -     CBC auto differential; Future  -     Basic metabolic panel; Future  -     TSH; Future  -     Vitamin B12; Future  -     Vitamin D; Future  -     CK; Future  -     Magnesium; Future    Adequate rest and hydration  Stretching  Follow up with PCP if no improvement

## 2017-03-17 NOTE — PROGRESS NOTES
[facility]  [address] [facility] [address]  [place] [address]  Phone:[phone]  Fax: [phone]      Established Patient       Patient Name: Sierra Cochran  : 1955  MRN:  [id number]    3/17/2017  Sigifredo Blakely MPA, PA-C    PCP:  Lindy Preston MD  Date of Last Visit: 2016    CC: Headache      IMPRESSION:       ICD-10-CM ICD-9-CM   1. Migraine without aura and without status migrainosus, not intractable G43.009 346.10   2. Tension headache G44.209 307.81   3. Trouble in sleeping G47.9 780.50   4. Intractable headache, unspecified chronicity pattern, unspecified headache type R51 784.0     5. Hx of nephrolithiasis, will not write topamax or zonegran  6. Hx of osteoporosis, will not write steroids     PLAN:   Continue neurontin 100 mg/100 mg/300 mg (did not need refills or want to adjust)  PCP has her on trazodone, she did not want to adjust  TNB/GONB given today, she may benefit from TPI in future  Short course of vistaril for next few evenings written for her, discussed adv effects/dosing, she agreed      Review:   Orders Placed This Encounter    hydrOXYzine pamoate (VISTARIL) 25 MG Cap    lidocaine HCL 20 mg/ml (2%) injection 6 mL    methylPREDNISolone acetate injection 80 mg     No orders of the defined types were placed in this encounter.      The patient is to continue to follow with the PCP for all other health conditions.    The patient indicates understanding of these issues and agrees to the plan    All current medications, test results as well as any necessary instructions were discussed with Sierra Cochran. Side effects of medications were explained. The patient indicates understanding of these issues and agrees to the plan and/or medication (s) discussed.      Follow Up with Dr. Rodriguez d/t my departure from the practice. She agreed.       INTERVAL HISTORY:      History of Present Illness:  Sierra Cochran is a 62 y.o. left handed, female. She  "presents alone for follow up. She  states:    Still having daily HA, start in shoulders and worse in occiput, L high parietal region then move to L temporalis. Occasionally the whole circumference of the head. No side effects from neurontin at 100 mg/100 mg/300 mg (the 300 mg adjusted by other clinicians). Pain ranges, however currently 4/10. States a possible trigger could be lack of sleep. Still taking trazodone, however, not helping. Has some leg discomfort today, of which, she will be seeing PCP. No endorsement of other neurologic changes or concerns.     Therapies tried in past:  maxalt   ambien  tizanadine (can not tolerate)   Gabapentin 300 mg at bedtime from rheumatology, 100 mg Qam/noon dose ineff   Tramadol for joint pain (takes multiple times a day)  OTC pian medicine   Trazodone to help help   Flexeril  mirapex  TNB/GONB done today  Hydroxyzine written today      From Rheumatology's notes (11/22/2016, Dr. Hwang): :"Currently on flexeril 20mg po qhs, trazadone 50mg po hs.Gabapetin helped. ELavil did not help. Never took cymbalta. Robaxin, savella, lyrica  She denies fever, weight loss, dry eyes or mouth, photosensitivity, rash, ulcer, raynaud's phenomenon, alopecia, dysphagia, diarrhea or blood in the stools"    Medication reviewed and documented below:  Current Outpatient Prescriptions   Medication Sig Dispense Refill    clobetasol (TEMOVATE) 0.05 % cream Apply topically 2 (two) times daily. 45 g 1    diclofenac sodium 1 % Gel Apply 2 g topically 4 (four) times daily. 100 g 2    diphenhydrAMINE (BENADRYL ALLERGY) 25 mg tablet Take 25 mg by mouth nightly as needed for Insomnia.      estradiol (ESTRACE) 1 MG tablet Take 1 tablet (1 mg total) by mouth once daily. 90 tablet 3    gabapentin (NEURONTIN) 100 MG capsule 1 pill am, 1 pill noon 60 capsule 11    gabapentin (NEURONTIN) 100 MG capsule TAKE THREE CAPSULES BY MOUTH IN THE EVENING 90 capsule 0    gabapentin (NEURONTIN) 300 MG capsule Take 1 " capsule (300 mg total) by mouth every evening. 30 capsule 2    omeprazole (PRILOSEC) 40 MG capsule Take 40 mg by mouth 2 (two) times daily before meals.      pramipexole (MIRAPEX) 1 MG tablet Take 1 tablet (1 mg total) by mouth every evening. 30 tablet 3    rizatriptan (MAXALT) 10 MG tablet Take 1 tablet (10 mg total) by mouth 3 (three) times daily as needed. 90 tablet 0    tramadol (ULTRAM) 50 mg tablet Take 1 tablet (50 mg total) by mouth every 6 (six) hours as needed for Pain. 120 tablet 0    trazodone (DESYREL) 50 MG tablet Take 1 tablet (50 mg total) by mouth every evening. 30 tablet 3    zolpidem (AMBIEN) 10 mg Tab TAKE ONE TABLET BY MOUTH AT BEDTIME AS NEEDED 30 tablet 0     No current facility-administered medications for this visit.      Review of patient's allergies indicates:   Allergen Reactions    Tizanidine Other (See Comments)     HALLUCINATED    Augmentin [amoxicillin-pot clavulanate]      Yeast infection    Codeine Other (See Comments)     epigastric pain    Demerol [meperidine] Other (See Comments)     indigestion       PMHx:   Past Medical History:   Diagnosis Date    Anemia     Arthritis     Bruises easily     Gastric bypass status for obesity     Hypoglycemia     Kidney stones     Low back pain     occasional    Low blood sugar     Migraine headache     MVP (mitral valve prolapse)     Osteopenia     Other complications of the administration of anesthesia or other sedation in labor and delivery, unspecified as to episode of care     sensitive to pain medications    RLS (restless legs syndrome)     Wears glasses        Fhx:  Family History   Problem Relation Age of Onset    Coronary artery disease Father 49    Heart disease Father 49     MI    Early death Father     Coronary artery disease Mother 68     CABG    Arthritis Mother     Asthma Mother     Diabetes Mother     Heart disease Mother     Osteoarthritis Mother     Cardiomyopathy Sister      congenital heart  disease    Cancer Paternal Uncle      lung cancer, tobacco use    Diabetes Sister     Heart disease Sister     Cancer Sister 58     breast    Arthritis Maternal Grandmother     Cancer Maternal Grandmother      colon    Diabetes Maternal Grandmother     Heart disease Maternal Grandmother     Arthritis Maternal Grandfather     Heart disease Maternal Grandfather     Arthritis Paternal Grandmother     Arthritis Paternal Grandfather     Cancer Paternal Uncle      lung cancer, tobacco use    Lupus Maternal Aunt      SHx:no tobacco, no etoh, no recreational drugs, 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      Review of Testing:  Reviewed in chart, nothing to add to today's visit     ROS:  + HA, joint pain  -N/V, CP, SOB, falls, GI bleed     PHYSICAL EXAM:      /68  Pulse 70  Resp 20  Ht 5' (1.524 m)  Wt 68.5 kg (151 lb 0.2 oz)  BMI 29.49 kg/m2   GEN:  NAD, lights on in exam room   HEENT: NC/AT, L temporalis/parietal region TTP, occipitalis and trapezius TTP   .    NEURO:  Mental Status:  Awake, alert and appropriately oriented to time, place, and person.  Normal attention and concentration.  Speech is fluent and appropriate language function.    Cranial Nerves:    Facial movement is symmetric.   Hearing is normal. Uvula in midline.     Motor:  Antigravity in all limbs, able to get up on exam table without assistance   Gait and Stance: ambulates without assistance, gait is NOT wide based      Procedure note:   GONB (Greater Occipital Nerve Block): After informed consent was obtained (a copy was given to office staff to scan into the EMR), the patient was asked to remain in a sitting position with her head resting prone on  Her chest. The area was prepped using alcohol swabs. 2% lidocaine (2 mL x2 sides of neck=4 mL total) and 40 mg (1 bottle per sitex2 for total of 80 mg)depomedrol was drawn up utilizing a 21 gauge needle. The occipital trigger points were palpated utilizing latex gloves, a 27 gauge needle and aspiration occured to ensure no medication was introduced into the blood stream during the technique. The medication was delivered bilateral (all of the above was duplicated for the opposite side) in sites 1) midway between the inion and mastoid along the occipital ridge, 2) 2 finger breaths superior lateral to the first injection and 3) 2 finger breaths superior medial to the first injection. The patient tolerated the procedure well with no active bleeding, erythema, or discharge.    Procedure note:   Nerve Block ( Trigeminal Nerve/Temporal Auricular Nerve CPT: 93302): After informed consent was obtained (asked office staff to scan into EMR), the patient was asked to remain in a sitting position.The area was prepped using alcohol swabs. 2% lidocaine (2.0 cc)  was drawn up utilizing a 22 gauge needle. A 30 gauge needle was used for the procedure. Three Temperoauricular locations were palpated on the RIGHT side of the head and 0.2 ccs injected into 3 separate sites (1 near the top of the ear and 2 along the parietal region of the head). 2 supraglabellar areas were palpated on the RIGHT, approx 5-6 mm above the orbital ridge and then 5-6 mm lateral along the orbital ridge. 0.2 cc per site for a total of 0.4 cc given. This was repeated all on the LEFT for a total of 1 full cc (5 injections, 0.2 cc a piece). The patient tolerated the procedure well with no active bleeding, erythema, or discharge.   The patient was assessed and allowed to leave after ten minutes.  Findings from repeat exam (10 mins after procedure) showed:    Gen: NAD  Derm: no drainage  Neuro: AOx3, FROM of all extremities, OOC/gait WNL   Attending available         CC:  Lindy Preston MD      This document has been electronically signed by Sigifredo Blakely MPA, PA-C on 3/17/2017, 10:07 AM.  I have personally typed this message using the EMR.      Attending, Dr. [place], was available during today's encounter. Any change to plan along with cosign to appear in the EMR.

## 2017-03-17 NOTE — MR AVS SNAPSHOT
[facility]  [address] [facility] [address]  [place] [address]  Phone: [phone]  Fax: [phone]                  Sierra Cochran   3/17/2017 11:40 AM   Office Visit    Description:  Female : 1955   Provider:  Simi Underwood NP   Department:  [facility]           Reason for Visit     Leg Pain           Diagnoses this Visit        Comments    Bilateral leg cramps    -  Primary     RLS (restless legs syndrome)         Fibromyalgia                To Do List           Future Appointments        Provider Department Dept Phone    3/17/2017 12:20 PM LAB, [place] [facility] [phone]      Goals (5 Years of Data)     None      [facility] On Call     [facility] Nurse Care Line -  Assistance  Registered nurses in the [facility] provide clinical advisement, health education, appointment booking, and other advisory services.  Call for this free service at [phone].             Medications           Message regarding Medications     Verify the changes and/or additions to your medication regime listed below are the same as discussed with your clinician today.  If any of these changes or additions are incorrect, please notify your healthcare provider.             Verify that the below list of medications is an accurate representation of the medications you are currently taking.  If none reported, the list may be blank. If incorrect, please contact your healthcare provider. Carry this list with you in case of emergency.           Current Medications     clobetasol (TEMOVATE) 0.05 % cream Apply topically 2 (two) times daily.    diclofenac sodium 1 % Gel Apply 2 g topically 4 (four) times daily.    diphenhydrAMINE (BENADRYL ALLERGY) 25 mg tablet Take 25 mg by mouth nightly as needed for Insomnia.    estradiol (ESTRACE) 1 MG tablet Take 1 tablet (1 mg total) by mouth once daily.    gabapentin (NEURONTIN) 100 MG capsule 1 pill am, 1 pill noon     gabapentin (NEURONTIN) 300 MG capsule Take 1 capsule (300 mg total) by mouth every evening.    omeprazole (PRILOSEC) 40 MG capsule Take 40 mg by mouth 2 (two) times daily before meals.    pramipexole (MIRAPEX) 1 MG tablet Take 1 tablet (1 mg total) by mouth every evening.    rizatriptan (MAXALT) 10 MG tablet Take 1 tablet (10 mg total) by mouth 3 (three) times daily as needed.    tramadol (ULTRAM) 50 mg tablet Take 1 tablet (50 mg total) by mouth every 6 (six) hours as needed for Pain.    trazodone (DESYREL) 50 MG tablet Take 1 tablet (50 mg total) by mouth every evening.    zolpidem (AMBIEN) 10 mg Tab TAKE ONE TABLET BY MOUTH AT BEDTIME AS NEEDED    hydrOXYzine pamoate (VISTARIL) 25 MG Cap 1 pill around bedtime as needed for insomnia           Clinical Reference Information           Your Vitals Were     BP Pulse Height Weight SpO2 BMI    116/78 59 5' (1.524 m) 68.5 kg (151 lb 0.2 oz) 98% 29.49 kg/m2      Blood Pressure          Most Recent Value    BP  116/78      Allergies as of 3/17/2017     Tizanidine    Augmentin [Amoxicillin-pot Clavulanate]    Codeine    Demerol [Meperidine]      Immunizations Administered on Date of Encounter - 3/17/2017     None      Orders Placed During Today's Visit     Future Labs/Procedures Expected by Expires    Basic metabolic panel  3/17/2017 5/16/2018    CBC auto differential  3/17/2017 6/15/2017    CK  3/17/2017 5/16/2018    Magnesium  3/17/2017 5/16/2018    TSH  3/17/2017 6/15/2017    Vitamin B12  3/17/2017 6/15/2017    Vitamin D  3/17/2017 5/16/2018      Language Assistance Services     ATTENTION: Language assistance services are available, free of charge. Please call [phone].      ATENCIÓN: Si miguella niko, tiene a gama disposición servicios gratuitos de asistencia lingüística. Llame al [phone].     CHÚ Ý: N?u b?n nói Ti?ng Vi?t, có các d?ch v? h? tr? ngôn ng? mi?n phí dành cho b?n. G?i s? [phone].         [facility] complies with  applicable Federal civil rights laws and does not discriminate on the basis of race, color, national origin, age, disability, or sex.

## 2017-04-11 DIAGNOSIS — M25.50 POLYARTHRALGIA: ICD-10-CM

## 2017-04-12 RX ORDER — RIZATRIPTAN BENZOATE 10 MG/1
TABLET ORAL
Qty: 90 TABLET | Refills: 0 | Status: SHIPPED | OUTPATIENT
Start: 2017-04-12

## 2017-04-12 RX ORDER — GABAPENTIN 100 MG/1
CAPSULE ORAL
Qty: 90 CAPSULE | Refills: 0 | Status: SHIPPED | OUTPATIENT
Start: 2017-04-12

## 2017-04-12 RX ORDER — HYDROXYZINE PAMOATE 25 MG/1
CAPSULE ORAL
Qty: 5 CAPSULE | Refills: 0 | Status: SHIPPED | OUTPATIENT
Start: 2017-04-12 | End: 2018-02-07

## 2017-05-11 ENCOUNTER — OFFICE VISIT (OUTPATIENT)
Dept: PAIN MEDICINE | Facility: CLINIC | Age: 62
End: 2017-05-11
Payer: COMMERCIAL

## 2017-05-11 VITALS
BODY MASS INDEX: 29.64 KG/M2 | HEART RATE: 64 BPM | DIASTOLIC BLOOD PRESSURE: 67 MMHG | SYSTOLIC BLOOD PRESSURE: 105 MMHG | HEIGHT: 60 IN | WEIGHT: 151 LBS

## 2017-05-11 DIAGNOSIS — R25.2 CRAMP OF BOTH LOWER EXTREMITIES: Primary | ICD-10-CM

## 2017-05-11 PROCEDURE — 99999 PR PBB SHADOW E&M-EST. PATIENT-LVL II: CPT | Mod: PBBFAC,,, | Performed by: PHYSICIAN ASSISTANT

## 2017-05-11 PROCEDURE — 99213 OFFICE O/P EST LOW 20 MIN: CPT | Mod: S$GLB,,, | Performed by: PHYSICIAN ASSISTANT

## 2017-05-11 RX ORDER — BACLOFEN 10 MG/1
10 TABLET ORAL 3 TIMES DAILY PRN
Qty: 90 TABLET | Refills: 0 | Status: SHIPPED | OUTPATIENT
Start: 2017-05-11 | End: 2018-02-07

## 2017-05-11 NOTE — PROGRESS NOTES
Referring Physician: No ref. provider found    PCP: Lindy Preston MD      CC: Low back and right leg pain    Interval History:  Ms. Cochran is a 62 y.o. female who presents today c/o bilateral LE cramping x 4 weeks. Cramping is worse at night and is interupting sleep. She has slept 4 hours in 2 days. She is tearful today. Lab work was unremarkable. She has been evaluated by PCP and neurology. She denies any Lower extremity weakness. She has not tried any medications. Back and right LE pain continue to benefit from TFESI on right at L4-5 and L5-S1. Denies b/b incontinence. Pain today is rated 5/10.  HPI:   Patient is a 60-year-old female with past medical history of anemia GERD referred to us for low back and right leg pain.  Pain has been present for over a year.  No traumatic incident.  She has constant throbbing and sharp pain in her lower back.  Pain radiates down her right leg into the side of her right foot.  Pain worsens with sitting, standing, laying, walking and lifting.  She denies any weakness.  No bowel bladder changes.  She has underwent physical therapy and chiropractic therapy with minimal benefit.  She was given some oral steroids a few months ago which provided some short-lived relief.  She had a recent lumbar MRI which showed facet arthropathy at multiple levels.  Leg pain and lower back pain is equally bothersome.  She rates her pain 5/10 today.    INTERVENTIONAL HISTORY:  Right TFESI at L4-5, L5-S1 on 5/31/16- 90% improvement    ROS:  CONSTITUTIONAL: No fevers, chills, night sweats, wt. loss, appetite changes  SKIN: no rashes or itching  ENT: No headaches, head trauma, vision changes, or eye pain  LYMPH NODES: None noticed   CV: No chest pain, palpitations.   RESP: No shortness of breath, dyspnea on exertion, cough, wheezing, or hemoptysis  GI: No nausea, emesis, diarrhea, constipation, melena, hematochezia, pain.    : No dysuria, hematuria, urgency, or frequency   HEME: No easy bruising,  bleeding problems  PSYCHIATRIC: No depression, anxiety, psychosis, hallucinations.  NEURO: No seizures, memory loss, dizziness or difficulty sleeping  MSK: + History of present illness      Past Medical History:   Diagnosis Date    Anemia     Arthritis     Bruises easily     Gastric bypass status for obesity     Hypoglycemia     Kidney stones     Low back pain     occasional    Low blood sugar     Migraine headache     MVP (mitral valve prolapse)     Osteopenia     Other complications of the administration of anesthesia or other sedation in labor and delivery, unspecified as to episode of care     sensitive to pain medications    RLS (restless legs syndrome)     Wears glasses      Past Surgical History:   Procedure Laterality Date    btl      carpal metacarpal interpositional arthroplasty Bilateral     CHOLECYSTECTOMY      COLONOSCOPY N/A 2/3/2016    Procedure: COLONOSCOPY;  Surgeon: Varun Garibay MD;  Location: [facility];  Service: Endoscopy;  Laterality: N/A;    FOOT NEUROMA SURGERY  Sept 2012    GASTRIC BYPASS  12/2004    sheela-en-y    TONSILLECTOMY      TONSILLECTOMY      TOTAL ABDOMINAL HYSTERECTOMY W/ BILATERAL SALPINGOOPHORECTOMY      uterine prolapse    VAGINAL DELIVERY      times 2     Family History   Problem Relation Age of Onset    Coronary artery disease Father 49    Heart disease Father 49     MI    Early death Father     Coronary artery disease Mother 68     CABG    Arthritis Mother     Asthma Mother     Diabetes Mother     Heart disease Mother     Osteoarthritis Mother     Cardiomyopathy Sister      congenital heart disease    Cancer Paternal Uncle      lung cancer, tobacco use    Diabetes Sister     Heart disease Sister     Cancer Sister 58     breast    Arthritis Maternal Grandmother     Cancer Maternal Grandmother      colon    Diabetes Maternal Grandmother     Heart disease Maternal Grandmother     Arthritis Maternal Grandfather     Heart disease  Maternal Grandfather     Arthritis Paternal Grandmother     Arthritis Paternal Grandfather     Cancer Paternal Uncle      lung cancer, tobacco use    Lupus Maternal Aunt      Social History     Social History    Marital status: Single     Spouse name: N/A    Number of children: N/A    Years of education: N/A     Occupational History     [facility] [employer]     Social History Main Topics    Smoking status: Never Smoker    Smokeless tobacco: Never Used    Alcohol use No    Drug use: No    Sexual activity: No     Other Topics Concern    None     Social History Narrative         Medications/Allergies: See med card    Vitals:    05/11/17 0821   BP: 105/67   Pulse: 64   Weight: 68.5 kg (151 lb 0.2 oz)   Height: 5' (1.524 m)   PainSc:   5         Physical exam:    GENERAL: A and O x3, the patient appears well groomed and is in no acute distress.  Skin: No rashes or obvious lesions  HEENT: normocephalic, atraumatic  CARDIOVASCULAR:  RRR  LUNGS: non labored breathing  ABDOMEN: soft, nontender   UPPER EXTREMITIES: Normal alignment, normal range of motion, no atrophy, no skin changes,  hair growth and nail growth normal and equal bilaterally. No swelling, no tenderness.    LOWER EXTREMITIES:  Normal alignment, normal range of motion, no atrophy, no skin changes,  hair growth and nail growth normal and equal bilaterally. No swelling, no tenderness.  CERVICAL SPINE:  Full ROM on flexion, extension, and lateral rotation.   Negative SPURLING  TTP b/l cervical paraspinal muscles.   LUMBAR SPINE  Lumbar spine: ROM is full with flexion extension and oblique extension with mild increased pain.    Jesus's test causes no increased pain on either side.    Supine straight leg raise is postive on right at 60°  Internal and external rotation of the hip causes no increased pain on either side.  Myofascial exam: No tenderness to palpation across lumbar paraspinous muscles.      MENTAL STATUS: normal orientation,  speech, language, and fund of knowledge for social situation.  Emotional state appropriate.    CRANIAL NERVES:  II:  PERRL bilaterally,   III,IV,VI: EOMI.    V:  Facial sensation equal bilaterally  VII:  Facial motor function normal.  VIII:  Hearing equal to finger rub bilaterally  IX/X: Gag normal, palate symmetric  XI:  Shoulder shrug equal, head turn equal  XII:  Tongue midline without fasciculations      MOTOR: Tone and bulk: normal bilateral upper and lower Strength: normal   Delt Bi Tri WE WF     R 5 5 5 5 5 5   L 5 5 5 5 5 5     IP ADD ABD Quad TA Gas HAM  R 5 5 5 5 5 5 5  L 5 5 5 5 5 5 5    SENSATION: Light touch and pinprick intact bilaterally  REFLEXES: normal, symmetric, nonbrisk.  Toes down, no clonus. No hoffmans.  GAIT: normal rise, base, steps, and arm swing.        Imaging:  MRI lumbar spine 10/2015  There is facet joint hypertrophy and some sclerosis noted at L3-4, L4-5 and L5-S1. Significant spinal canal or neural foraminal stenosis however is not seen. Degenerative disk disease is not identified    Assessment:  Ms. Cochran is a 62 y.o. female with   1. Cramp of both lower extremities        Plan:  1. I have stressed the importance of physical activity and exercise to improve overall health. LE pain may benefit from physical therapy  2. Baclofen 10 mg q 8 h prn # 90  3. Ordered compounding cream for topical symptomatic relief.  4. If no improvement with above, will schedule PT and f/u with neurology

## 2018-02-07 ENCOUNTER — PATIENT MESSAGE (OUTPATIENT)
Dept: FAMILY MEDICINE | Facility: CLINIC | Age: 63
End: 2018-02-07

## 2018-02-07 DIAGNOSIS — R21 RASH OF GROIN: ICD-10-CM

## 2018-02-07 RX ORDER — CLOBETASOL PROPIONATE 0.5 MG/G
CREAM TOPICAL 2 TIMES DAILY
Qty: 45 G | Refills: 0 | Status: SHIPPED | OUTPATIENT
Start: 2018-02-07 | End: 2019-02-07

## 2018-02-08 ENCOUNTER — TELEPHONE (OUTPATIENT)
Dept: HEMATOLOGY/ONCOLOGY | Facility: CLINIC | Age: 63
End: 2018-02-08

## 2018-02-14 ENCOUNTER — OFFICE VISIT (OUTPATIENT)
Dept: HEMATOLOGY/ONCOLOGY | Facility: CLINIC | Age: 63
End: 2018-02-14
Payer: COMMERCIAL

## 2018-02-14 VITALS
SYSTOLIC BLOOD PRESSURE: 102 MMHG | TEMPERATURE: 98 F | DIASTOLIC BLOOD PRESSURE: 57 MMHG | HEART RATE: 69 BPM | BODY MASS INDEX: 26.35 KG/M2 | WEIGHT: 134.25 LBS | HEIGHT: 60 IN

## 2018-02-14 DIAGNOSIS — K21.9 GASTROESOPHAGEAL REFLUX DISEASE WITHOUT ESOPHAGITIS: ICD-10-CM

## 2018-02-14 DIAGNOSIS — C73 THYROID CANCER: Primary | ICD-10-CM

## 2018-02-14 DIAGNOSIS — E66.9 OBESITY, CLASS I, BMI 30-34.9: ICD-10-CM

## 2018-02-14 DIAGNOSIS — C73 PAPILLARY THYROID CARCINOMA: ICD-10-CM

## 2018-02-14 PROCEDURE — 99999 PR PBB SHADOW E&M-EST. PATIENT-LVL III: CPT | Mod: PBBFAC,,, | Performed by: INTERNAL MEDICINE

## 2018-02-14 PROCEDURE — 3008F BODY MASS INDEX DOCD: CPT | Mod: S$GLB,,, | Performed by: INTERNAL MEDICINE

## 2018-02-14 PROCEDURE — 99204 OFFICE O/P NEW MOD 45 MIN: CPT | Mod: S$GLB,,, | Performed by: INTERNAL MEDICINE

## 2018-02-14 NOTE — PROGRESS NOTES
Subjective:       Patient ID: Sierra Cochran is a 63 y.o. female.    Chief Complaint: Papillary Thyroid Cancer    Ms. Cochran is a 63-year-old woman with OA, MVP, and FM who presents to hematology/oncology clinic for a second opinion regarding her recently diagnosed papillary thyroid cancer. Though she lives near [place], [place], where she receives the majority of her medical care, she travels to and from the area frequently for work. She has lost thirty pounds unintentionally over the course of the last year, associated with early satiety and occasional night sweats. No fevers, chills, abdominal pain, nausea, vomiting, constipation, or diarrhea. She had a goiter diagnosed 30 years ago while pregnant with her first son and followed with routine TSH + fT4 by her PCP, with stable results until recently, prompting an US thyroid that showed diffusely heterogenous multinodular and hypervascular thyroid, with the largest node measuring 16 mm. She did not bring any additional records with her, but reports that she already has an appointment with surgical oncology in [place] for Monday the 19th.      Review of Systems   Constitutional: Positive for appetite change and unexpected weight change.   Eyes: Positive for visual disturbance.   Respiratory: Positive for cough. Negative for shortness of breath.    Cardiovascular: Negative for chest pain.   Gastrointestinal: Negative for abdominal pain and diarrhea.   Genitourinary: Positive for frequency.   Musculoskeletal: Positive for back pain.   Skin: Negative for rash.   Neurological: Positive for headaches.   Hematological: Negative for adenopathy.   Psychiatric/Behavioral: The patient is not nervous/anxious.        Past Medical History:   Diagnosis Date    Anemia     Arthritis     Bruises easily     Gastric bypass status for obesity     Hypoglycemia     Kidney stones     Low back pain     occasional    Low blood sugar     Migraine headache     MVP (mitral valve prolapse)      Osteopenia     Other complications of the administration of anesthesia or other sedation in labor and delivery, unspecified as to episode of care     sensitive to pain medications    RLS (restless legs syndrome)     Wears glasses          Past Surgical History:   Procedure Laterality Date    btl      carpal metacarpal interpositional arthroplasty Bilateral     CHOLECYSTECTOMY      COLONOSCOPY N/A 2/3/2016    Procedure: COLONOSCOPY;  Surgeon: Varun Garibay MD;  Location: [facility];  Service: Endoscopy;  Laterality: N/A;    FOOT NEUROMA SURGERY  Sept 2012    GASTRIC BYPASS  12/2004    sheela-en-y    TONSILLECTOMY      TONSILLECTOMY      TOTAL ABDOMINAL HYSTERECTOMY W/ BILATERAL SALPINGOOPHORECTOMY      uterine prolapse    VAGINAL DELIVERY      times 2         Family History   Problem Relation Age of Onset    Coronary artery disease Father 49    Heart disease Father 49     MI    Early death Father     Coronary artery disease Mother 68     CABG    Arthritis Mother     Asthma Mother     Diabetes Mother     Heart disease Mother     Osteoarthritis Mother     Cardiomyopathy Sister      congenital heart disease    Cancer Paternal Uncle      lung cancer, tobacco use    Diabetes Sister     Heart disease Sister     Cancer Sister 58     breast    Arthritis Maternal Grandmother     Cancer Maternal Grandmother      colon    Diabetes Maternal Grandmother     Heart disease Maternal Grandmother     Arthritis Maternal Grandfather     Heart disease Maternal Grandfather     Arthritis Paternal Grandmother     Arthritis Paternal Grandfather     Cancer Paternal Uncle      lung cancer, tobacco use    Lupus Maternal Aunt          Social History     Social History    Marital status: Single     Spouse name: N/A    Number of children: N/A    Years of education: N/A     Occupational History     [facility] [employer] [facility]     Social History Main Topics    Smoking status: Never Smoker     Smokeless tobacco: Never Used    Alcohol use No    Drug use: No    Sexual activity: No     Other Topics Concern    Not on file     Social History Narrative    No narrative on file         Objective:         Vitals:    02/14/18 0809   BP: (!) 102/57   Pulse: 69   Temp: 97.8 °F (36.6 °C)       Physical Exam   Constitutional: She is oriented to person, place, and time. She appears well-developed and well-nourished. No distress.   HENT:   Head: Normocephalic and atraumatic.   Mild thyroid enlargement, R > L   Eyes: Conjunctivae and EOM are normal. Pupils are equal, round, and reactive to light.   Neck: Normal range of motion. Neck supple. No JVD present.   Cardiovascular: Normal rate and regular rhythm.  Exam reveals no gallop and no friction rub.    No murmur heard.  Pulmonary/Chest: Effort normal and breath sounds normal. No respiratory distress. She has no wheezes.   Abdominal: Soft. Bowel sounds are normal. She exhibits no distension and no mass. There is no tenderness.   Musculoskeletal: Normal range of motion. She exhibits no edema or tenderness.   Neurological: She is alert and oriented to person, place, and time. She displays normal reflexes. No cranial nerve deficit.   Skin: Skin is warm and dry. No rash noted. No erythema.   Psychiatric: She has a normal mood and affect. Her behavior is normal.       Assessment:       1. Thyroid cancer        Plan:       Diagnoses and all orders for this visit:    Papillary Thyroid cancer  -- Stable  -- US showing multinodular, hypervascular thyroid  -- s/p biopsy reportedly showing papillary thyroid cancer  -- She follows with an outside endocrinologist in [place] and has a follow-up surgical oncology appointment for Monday the 19th  -- Re-assured her regarding the low rate of metastasis of papillary thyroid cancer, answered questions to her satisfaction, and encouraged her to follow-up with her providers as scheduled  -- If she changes her mind and would like for her  treatment to be at [facility], she would need to follow-up with endocrinology.    Patient seen and discussed with Dr. March, who helped formulate the above plan. No follow-up needed at present.       60 minutes were spent face to face with the patient to discuss the disease, natural history, treatment options and survival statistics. I have provided the patient with an opportunity to ask questions and have all questions answered to his satisfaction.     I have seen the patient, reviewed the resident's assessment and plan.  I have personally interviewed and examined the patient at bedside and agree.      Mae March MD  Hematology and Medical Oncology  Bone Marrow Transplant  [facility]      Distress Screening Results: Psychosocial Distress screening score of Distress Score: 0 noted and reviewed. No intervention indicated.

## 2018-02-15 PROBLEM — C73 PAPILLARY THYROID CARCINOMA: Status: ACTIVE | Noted: 2018-02-15

## 2020-04-21 DIAGNOSIS — Z01.84 ANTIBODY RESPONSE EXAMINATION: ICD-10-CM

## 2020-05-21 DIAGNOSIS — Z01.84 ANTIBODY RESPONSE EXAMINATION: ICD-10-CM

## 2020-06-20 DIAGNOSIS — Z01.84 ANTIBODY RESPONSE EXAMINATION: ICD-10-CM

## 2020-07-20 DIAGNOSIS — Z01.84 ANTIBODY RESPONSE EXAMINATION: ICD-10-CM

## 2020-08-19 DIAGNOSIS — Z01.84 ANTIBODY RESPONSE EXAMINATION: ICD-10-CM

## 2020-09-18 DIAGNOSIS — Z01.84 ANTIBODY RESPONSE EXAMINATION: ICD-10-CM

## 2020-10-18 DIAGNOSIS — Z01.84 ANTIBODY RESPONSE EXAMINATION: ICD-10-CM

## 2020-11-17 DIAGNOSIS — Z01.84 ANTIBODY RESPONSE EXAMINATION: ICD-10-CM

## 2023-08-14 ENCOUNTER — HOSPITAL ENCOUNTER (EMERGENCY)
Facility: HOSPITAL | Age: 68
Discharge: HOME | End: 2023-08-14
Attending: EMERGENCY MEDICINE
Payer: MEDICARE

## 2023-08-14 VITALS
OXYGEN SATURATION: 96 % | TEMPERATURE: 98 F | HEART RATE: 88 BPM | WEIGHT: 130 LBS | SYSTOLIC BLOOD PRESSURE: 112 MMHG | DIASTOLIC BLOOD PRESSURE: 59 MMHG | HEIGHT: 60 IN | BODY MASS INDEX: 25.52 KG/M2 | RESPIRATION RATE: 18 BRPM

## 2023-08-14 DIAGNOSIS — U07.1 COVID: Primary | ICD-10-CM

## 2023-08-14 LAB
INFLUENZA A, MOLECULAR: NEGATIVE
INFLUENZA B, MOLECULAR: NEGATIVE
SARS-COV-2 RDRP RESP QL NAA+PROBE: POSITIVE
SPECIMEN SOURCE: NORMAL

## 2023-08-14 PROCEDURE — 87502 INFLUENZA DNA AMP PROBE: CPT | Performed by: EMERGENCY MEDICINE

## 2023-08-14 PROCEDURE — 94760 N-INVAS EAR/PLS OXIMETRY 1: CPT

## 2023-08-14 PROCEDURE — U0002 COVID-19 LAB TEST NON-CDC: HCPCS | Performed by: EMERGENCY MEDICINE

## 2023-08-14 PROCEDURE — 99283 EMERGENCY DEPT VISIT LOW MDM: CPT | Mod: 25

## 2023-08-14 PROCEDURE — 94640 AIRWAY INHALATION TREATMENT: CPT

## 2023-08-14 PROCEDURE — 25000242 PHARM REV CODE 250 ALT 637 W/ HCPCS: Performed by: EMERGENCY MEDICINE

## 2023-08-14 RX ORDER — FAMOTIDINE 40 MG/1
40 TABLET, FILM COATED ORAL
COMMUNITY
Start: 2023-08-01

## 2023-08-14 RX ORDER — ALPRAZOLAM 0.5 MG/1
0.5 TABLET ORAL 3 TIMES DAILY PRN
COMMUNITY
Start: 2023-05-18 | End: 2024-05-17

## 2023-08-14 RX ORDER — ONDANSETRON 4 MG/1
TABLET, ORALLY DISINTEGRATING ORAL
COMMUNITY

## 2023-08-14 RX ORDER — ROPINIROLE 5 MG/1
5 TABLET, FILM COATED ORAL NIGHTLY
COMMUNITY
Start: 2023-08-01

## 2023-08-14 RX ORDER — MAGNESIUM 200 MG
TABLET ORAL
COMMUNITY

## 2023-08-14 RX ORDER — PANTOPRAZOLE SODIUM 40 MG/1
40 TABLET, DELAYED RELEASE ORAL
COMMUNITY
Start: 2023-08-01

## 2023-08-14 RX ORDER — LEVOTHYROXINE SODIUM 125 UG/1
125 TABLET ORAL
COMMUNITY
Start: 2023-08-04

## 2023-08-14 RX ORDER — IPRATROPIUM BROMIDE AND ALBUTEROL SULFATE 2.5; .5 MG/3ML; MG/3ML
3 SOLUTION RESPIRATORY (INHALATION)
Status: COMPLETED | OUTPATIENT
Start: 2023-08-14 | End: 2023-08-14

## 2023-08-14 RX ADMIN — IPRATROPIUM BROMIDE AND ALBUTEROL SULFATE 3 ML: .5; 2.5 SOLUTION RESPIRATORY (INHALATION) at 08:08

## 2023-08-14 NOTE — ED TRIAGE NOTES
Sierra DE Sammie is here with fever and body aches, congestion and cough, headache starting Sunday AM

## 2023-08-14 NOTE — ED NOTES
C/O cough, congestion, both nasal  and cough with clear to yellow sputum. She states she was at outside helping with yard sale in heat and dust and also has several co-workers test positive for Covid last week.

## 2023-08-14 NOTE — ED PROVIDER NOTES
Encounter Date: 8/14/2023       History     Chief Complaint   Patient presents with    Cough     Fever and congestion     This patient presents the emergency department complaints of cough and congestion going on for couple of days now.  Patient is worried about having COVID.  No other complaints of at this time.    The history is provided by the patient.     Review of patient's allergies indicates:   Allergen Reactions    Tizanidine Other (See Comments)     HALLUCINATED    Augmentin [amoxicillin-pot clavulanate]      Yeast infection    Codeine Other (See Comments)     epigastric pain    Demerol [meperidine] Other (See Comments)     indigestion     Past Medical History:   Diagnosis Date    Anemia     Arthritis     Bruises easily     Gastric bypass status for obesity     Hypoglycemia     Kidney stones     Low back pain     occasional    Low blood sugar     Migraine headache     MVP (mitral valve prolapse)     Osteopenia     Other complications of the administration of anesthesia or other sedation in labor and delivery, unspecified as to episode of care     sensitive to pain medications    RLS (restless legs syndrome)     Wears glasses      Past Surgical History:   Procedure Laterality Date    btl      carpal metacarpal interpositional arthroplasty Bilateral     CHOLECYSTECTOMY      COLONOSCOPY N/A 2/3/2016    Procedure: COLONOSCOPY;  Surgeon: Varun Garibay MD;  Location: [facility];  Service: Endoscopy;  Laterality: N/A;    FOOT NEUROMA SURGERY  Sept 2012    GASTRIC BYPASS  12/2004    sheela-en-y    TONSILLECTOMY      TONSILLECTOMY      TOTAL ABDOMINAL HYSTERECTOMY W/ BILATERAL SALPINGOOPHORECTOMY      uterine prolapse    VAGINAL DELIVERY      times 2     Family History   Problem Relation Age of Onset    Coronary artery disease Father 49    Heart disease Father 49        MI    Early death Father     Coronary artery disease Mother 68        CABG    Arthritis Mother     Asthma Mother     Diabetes Mother     Heart  disease Mother     Osteoarthritis Mother     Cardiomyopathy Sister         congenital heart disease    Cancer Paternal Uncle         lung cancer, tobacco use    Diabetes Sister     Heart disease Sister     Cancer Sister 58        breast    Arthritis Maternal Grandmother     Cancer Maternal Grandmother         colon    Diabetes Maternal Grandmother     Heart disease Maternal Grandmother     Arthritis Maternal Grandfather     Heart disease Maternal Grandfather     Arthritis Paternal Grandmother     Arthritis Paternal Grandfather     Cancer Paternal Uncle         lung cancer, tobacco use    Lupus Maternal Aunt      Social History     Tobacco Use    Smoking status: Never    Smokeless tobacco: Never   Substance Use Topics    Alcohol use: No     Alcohol/week: 0.0 standard drinks of alcohol    Drug use: No     Review of Systems   Constitutional: Negative.    HENT:  Positive for congestion.    Eyes: Negative.    Respiratory:  Positive for cough.    Cardiovascular: Negative.    Gastrointestinal: Negative.    Endocrine: Negative.    Genitourinary: Negative.    Musculoskeletal: Negative.    Skin: Negative.    Allergic/Immunologic: Negative.    Neurological: Negative.    Hematological: Negative.    Psychiatric/Behavioral: Negative.     All other systems reviewed and are negative.      Physical Exam     Initial Vitals [08/14/23 0607]   BP Pulse Resp Temp SpO2   101/61 78 18 98.4 °F (36.9 °C) 97 %      MAP       --         Physical Exam    Nursing note and vitals reviewed.  Constitutional: Vital signs are normal. She appears well-developed. She is active and cooperative.   HENT:   Head: Normocephalic and atraumatic.   Eyes: Conjunctivae, EOM and lids are normal. Pupils are equal, round, and reactive to light.   Neck: Trachea normal and phonation normal. Neck supple. No thyroid mass present.   Normal range of motion.   Full passive range of motion without pain.     Cardiovascular:  Normal rate, regular rhythm, S1 normal, S2  normal, normal heart sounds, intact distal pulses and normal pulses.           Pulmonary/Chest: Effort normal and breath sounds normal.   Abdominal: Abdomen is soft and flat. Bowel sounds are normal. There is no abdominal tenderness.   Musculoskeletal:         General: Normal range of motion.      Cervical back: Full passive range of motion without pain, normal range of motion and neck supple.     Lymphadenopathy:     She has no axillary adenopathy.   Neurological: She is alert and oriented to person, place, and time.   Skin: Skin is warm, dry and intact.   Psychiatric: She has a normal mood and affect. Her speech is normal and behavior is normal. Judgment and thought content normal. Cognition and memory are normal.         ED Course   Procedures  Labs Reviewed   SARS-COV-2 RNA AMPLIFICATION, QUAL - Abnormal; Notable for the following components:       Result Value    SARS-CoV-2 RNA, Amplification, Qual Positive (*)     All other components within normal limits    Narrative:     Covid critical result(s) called and verbal readback obtained from   Bertha Rodriguez. by [facility] 08/14/2023 07:49   INFLUENZA A & B BY MOLECULAR          Imaging Results    None          Medications   albuterol-ipratropium 2.5 mg-0.5 mg/3 mL nebulizer solution 3 mL (3 mLs Nebulization Given 8/14/23 0827)                 ED Course as of 08/14/23 2006   Mon Aug 14, 2023   0801 The patient was handed over to me at the change of shift by Dr. Rousseau pending her COVID and influenza swabs.  The patient's COVID test is noted to be positive.  The patient is not hypoxic and is stable for discharge to home.  She is referred to primary care for follow-up.  She can take over-the-counter medications as needed for symptomatic relief. [PE]      ED Course User Index  [PE] Michael Cortez MD                 Clinical Impression:   Final diagnoses:  [U07.1] COVID (Primary)        ED Disposition Condition    Discharge Stable          ED Prescriptions    None        Follow-up Information       Follow up With Specialties Details Why Contact Info    [facility]  Schedule an appointment as soon as possible for a visit   [facility] [id number]  [phone]               Damián Rousseau MD  08/14/23 2006

## 2024-09-13 ENCOUNTER — HOSPITAL ENCOUNTER (EMERGENCY)
Facility: HOSPITAL | Age: 69
Discharge: HOME | End: 2024-09-13
Attending: EMERGENCY MEDICINE
Payer: MEDICARE

## 2024-09-13 VITALS
HEIGHT: 60 IN | OXYGEN SATURATION: 100 % | BODY MASS INDEX: 25.52 KG/M2 | HEART RATE: 60 BPM | DIASTOLIC BLOOD PRESSURE: 65 MMHG | RESPIRATION RATE: 18 BRPM | WEIGHT: 130 LBS | SYSTOLIC BLOOD PRESSURE: 140 MMHG | TEMPERATURE: 98 F

## 2024-09-13 DIAGNOSIS — M54.50 ACUTE LOW BACK PAIN DUE TO TRAUMA: ICD-10-CM
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
## 2024-09-13 DIAGNOSIS — G89.11 ACUTE LOW BACK PAIN DUE TO TRAUMA: ICD-10-CM
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
## 2024-09-13 DIAGNOSIS — M25.561 ACUTE PAIN OF RIGHT KNEE: ICD-10-CM

## 2024-09-13 PROCEDURE — 99284 EMERGENCY DEPT VISIT MOD MDM: CPT | Mod: 25

## 2024-09-13 PROCEDURE — 25000003 PHARM REV CODE 250

## 2024-09-13 RX ORDER — ACETAMINOPHEN 325 MG/1
650 TABLET ORAL
Status: COMPLETED | OUTPATIENT
Start: 2024-09-13 | End: 2024-09-13

## 2024-09-13 RX ADMIN — ACETAMINOPHEN 650 MG: 325 TABLET ORAL at 08:09

## 2024-09-13 NOTE — Clinical Note
"Sierra Milian" Sammie was seen and treated in our emergency department on 9/13/2024.  She may return to work on 09/18/2024.       If you have any questions or concerns, please don't hesitate to call.      Elvie LARA    "

## 2024-09-13 NOTE — Clinical Note
"Sierra Hoguealex Cochran was seen and treated in our emergency department on 9/13/2024.  She may return to work on 09/18/2024.       If you have any questions or concerns, please don't hesitate to call.      JANE Bermeo RN    "

## 2024-09-13 NOTE — Clinical Note
"Sierra Milian" Sammie was seen and treated in our emergency department on 9/13/2024.  She may return to work on 09/18/2024.       If you have any questions or concerns, please don't hesitate to call.       RN    "

## 2024-09-14 NOTE — DISCHARGE INSTRUCTIONS
You may continue to take Tylenol for pain.  You may also use ice and heat on the area.  Please return to the emergency department if your symptoms worsen.

## 2024-09-14 NOTE — ED PROVIDER NOTES
Encounter Date: 9/13/2024       History     Chief Complaint   Patient presents with    Fall     Pt turned and fell injuring her R knee and lower back     69-year-old female presents to the emergency department after she slipped at work and fell on her butt.  Patient says that she fell in an upright position.  She did not hit her head or lose consciousness.  States that the most painful part is her but in her right knee.  No numbness or tingling in her lower extremities, bowel or bladder incontinence, IV drug use, saddle anesthesia.        Review of patient's allergies indicates:   Allergen Reactions    Tizanidine Other (See Comments)     HALLUCINATED    Augmentin [amoxicillin-pot clavulanate]      Yeast infection    Codeine Other (See Comments)     epigastric pain    Demerol [meperidine] Other (See Comments)     indigestion     Past Medical History:   Diagnosis Date    Anemia     Arthritis     Bruises easily     Gastric bypass status for obesity     Hypoglycemia     Kidney stones     Low back pain     occasional    Low blood sugar     Migraine headache     MVP (mitral valve prolapse)     Osteopenia     Other complications of the administration of anesthesia or other sedation in labor and delivery, unspecified as to episode of care     sensitive to pain medications    RLS (restless legs syndrome)     Wears glasses      Past Surgical History:   Procedure Laterality Date    btl      carpal metacarpal interpositional arthroplasty Bilateral     CHOLECYSTECTOMY      COLONOSCOPY N/A 2/3/2016    Procedure: COLONOSCOPY;  Surgeon: Varun Garibay MD;  Location: [facility];  Service: Endoscopy;  Laterality: N/A;    FOOT NEUROMA SURGERY  Sept 2012    GASTRIC BYPASS  12/2004    sheela-en-y    TONSILLECTOMY      TONSILLECTOMY      TOTAL ABDOMINAL HYSTERECTOMY W/ BILATERAL SALPINGOOPHORECTOMY      uterine prolapse    VAGINAL DELIVERY      times 2     Family History   Problem Relation Name Age of Onset    Coronary artery disease  Father  49    Heart disease Father  49        MI    Early death Father      Coronary artery disease Mother  68        CABG    Arthritis Mother      Asthma Mother      Diabetes Mother      Heart disease Mother      Osteoarthritis Mother      Cardiomyopathy Sister          congenital heart disease    Cancer Paternal Uncle          lung cancer, tobacco use    Diabetes Sister      Heart disease Sister      Cancer Sister  58        breast    Arthritis Maternal Grandmother      Cancer Maternal Grandmother          colon    Diabetes Maternal Grandmother      Heart disease Maternal Grandmother      Arthritis Maternal Grandfather      Heart disease Maternal Grandfather      Arthritis Paternal Grandmother      Arthritis Paternal Grandfather      Cancer Paternal Uncle          lung cancer, tobacco use    Lupus Maternal Aunt       Social History     Tobacco Use    Smoking status: Never    Smokeless tobacco: Never   Substance Use Topics    Alcohol use: No     Alcohol/week: 0.0 standard drinks of alcohol    Drug use: No     Review of Systems   Constitutional: Negative.    Respiratory: Negative.     Cardiovascular: Negative.    Musculoskeletal:  Positive for back pain.       Physical Exam     Initial Vitals [09/13/24 1923]   BP Pulse Resp Temp SpO2   (!) 142/71 63 18 97.8 °F (36.6 °C) 100 %      MAP       --         Physical Exam    Vitals reviewed.  Constitutional: She appears well-developed and well-nourished. She is not diaphoretic. No distress.   HENT:   Mouth/Throat: Oropharynx is clear and moist.   Eyes: Conjunctivae and EOM are normal.   Neck:   Normal range of motion.  Cardiovascular:  Normal rate, regular rhythm and normal heart sounds.           Pulmonary/Chest: Breath sounds normal.   Musculoskeletal:         General: Tenderness present. No edema. Normal range of motion.      Cervical back: Normal range of motion.      Comments: Mild tenderness to the lumbar region.  Do not note any discoloration, swelling, warmth to  the back.  No step-offs palpated.  There is mild swelling over the right knee without discoloration.  Patient has mild tenderness to palpation.  Negative anterior drawer.  Negative valgus and varus stress.     Neurological: She is alert. She has normal strength.   5/5 strength.  Sensation intact throughout.  Ambulatory gait in the emergency department         ED Course   Procedures  Labs Reviewed - No data to display       Imaging Results              X-Ray Lumbar Spine Ap And Lateral (Final result)  Result time 09/13/24 21:28:04      Final result by Jonas Rizvi MD (09/13/24 21:28:04)                   Impression:      As above.      Electronically signed by: Jonas Rizvi  Date:    09/13/2024  Time:    21:28               Narrative:    EXAMINATION:  XR LUMBAR SPINE AP AND LATERAL    CLINICAL HISTORY:  Fall;    TECHNIQUE:  AP, lateral and spot images were performed of the lumbar spine.    COMPARISON:  11/25/2016    FINDINGS:  Osteopenia.  No significant discogenic disease.  Mild lower lumbar facet arthrosis.  No acute fracture or listhesis.                                       X-Ray Knee 3 View Right (Final result)  Result time 09/13/24 21:27:14      Final result by Jonas Rizvi MD (09/13/24 21:27:14)                   Impression:      No acute findings.      Electronically signed by: Jonas Rizvi  Date:    09/13/2024  Time:    21:27               Narrative:    EXAMINATION:  XR KNEE 3 VIEW RIGHT    CLINICAL HISTORY:  Unspecified fall, initial encounter    TECHNIQUE:  AP, lateral, and Merchant views of the right knee were performed.    COMPARISON:  None    FINDINGS:  Osteopenia.  No acute fracture or dislocation.  Joint spaces are maintained.                                       X-Ray Hips Bilateral 2 View Incl AP Pelvis (Final result)  Result time 09/13/24 21:26:09      Final result by Jonas Rizvi MD (09/13/24 21:26:09)                   Impression:      As  above.      Electronically signed by: Jonas Rizvi  Date:    09/13/2024  Time:    21:26               Narrative:    EXAMINATION:  XR HIPS BILATERAL 2 VIEW INCL AP PELVIS    CLINICAL HISTORY:  Unspecified fall, initial encounter    TECHNIQUE:  AP view of the pelvis and frogleg lateral views of both hips were performed.    COMPARISON:  None.    FINDINGS:  Osteopenia.  No acute fracture or dislocation.  Intraosseous lucencies in the left greater trochanter may be postsurgical, posttraumatic, or degenerative.  Mild bilateral hip osteoarthritis.                                       Medications   acetaminophen tablet 650 mg (650 mg Oral Given 9/13/24 2010)     Medical Decision Making  69-year-old female took a fall at work hurting her back and her right knee. Considerations include but not limited to vertebral fracture/dislocation, herniated disc, strain, knee fracture/dislocation.  Vitals stable.  Patient afebrile.  Patient states that she fell at work and felt immediate pain in her back.  She fell an upright position and did not hit her head or lose consciousness.  She has some mild tenderness over the lumbar region.  I do not note any discoloration, swelling, increased warmth to the back.  Mild swelling of the right knee with tenderness to palpation.  X-rays of the bilateral hip, right knee, and lumbar spine are unremarkable for any acute osseous abnormality.  Patient was given a dose of Tylenol here in the emergency department for pain.  Educated the patient on pain management.  Patient verbalized her understanding of the plan and agreed.  Plan also discussed with my attending and all questions were answered at the bedside.    Amount and/or Complexity of Data Reviewed  Radiology: ordered.    Risk  OTC drugs.                                      Clinical Impression:  Final diagnoses:  [W19.XXXA] Fall  [Y99.0] Work related injury (Primary)  [M25.561] Acute pain of right knee  [M54.50, G89.11] Acute low back pain  due to trauma          ED Disposition Condition    Discharge Stable          ED Prescriptions    None       Follow-up Information       Follow up With Specialties Details Why Contact Info    [facility]  Call   [address] [place] [id number]  [phone]               Daisy Castillo, PA-C  09/13/24 4081